# Patient Record
Sex: FEMALE | Race: WHITE | NOT HISPANIC OR LATINO | Employment: OTHER | URBAN - METROPOLITAN AREA
[De-identification: names, ages, dates, MRNs, and addresses within clinical notes are randomized per-mention and may not be internally consistent; named-entity substitution may affect disease eponyms.]

---

## 2017-07-25 ENCOUNTER — COMMUNICATION - HEALTHEAST (OUTPATIENT)
Dept: INTERNAL MEDICINE | Facility: CLINIC | Age: 82
End: 2017-07-25

## 2017-08-02 ENCOUNTER — AMBULATORY - HEALTHEAST (OUTPATIENT)
Dept: INTERNAL MEDICINE | Facility: CLINIC | Age: 82
End: 2017-08-02

## 2017-08-02 ENCOUNTER — OFFICE VISIT - HEALTHEAST (OUTPATIENT)
Dept: INTERNAL MEDICINE | Facility: CLINIC | Age: 82
End: 2017-08-02

## 2017-08-02 ENCOUNTER — COMMUNICATION - HEALTHEAST (OUTPATIENT)
Dept: INTERNAL MEDICINE | Facility: CLINIC | Age: 82
End: 2017-08-02

## 2017-08-02 ENCOUNTER — HOSPITAL ENCOUNTER (OUTPATIENT)
Dept: CT IMAGING | Facility: CLINIC | Age: 82
Discharge: HOME | End: 2017-08-02
Attending: INTERNAL MEDICINE

## 2017-08-02 DIAGNOSIS — H40.9 GLAUCOMA: ICD-10-CM

## 2017-08-02 DIAGNOSIS — R10.31 RIGHT LOWER QUADRANT PAIN: ICD-10-CM

## 2017-08-02 DIAGNOSIS — H35.30 MACULAR DEGENERATION: ICD-10-CM

## 2017-08-02 DIAGNOSIS — C18.2 MALIGNANT NEOPLASM OF ASCENDING COLON (H): ICD-10-CM

## 2017-08-02 RX ORDER — LATANOPROST 50 UG/ML
1 SOLUTION/ DROPS OPHTHALMIC AT BEDTIME
Status: SHIPPED | COMMUNITY
Start: 2017-05-15

## 2017-08-02 ASSESSMENT — MIFFLIN-ST. JEOR: SCORE: 835.45

## 2017-08-07 ENCOUNTER — HOSPITAL ENCOUNTER (OUTPATIENT)
Dept: LAB | Age: 82
Setting detail: SPECIMEN
Discharge: HOME | End: 2017-08-07

## 2017-08-07 ENCOUNTER — OFFICE VISIT - HEALTHEAST (OUTPATIENT)
Dept: SURGERY | Facility: CLINIC | Age: 82
End: 2017-08-07

## 2017-08-07 DIAGNOSIS — D49.0 COLON TUMOR: ICD-10-CM

## 2017-08-07 LAB — CEA SERPL-MCNC: 11.2 NG/ML (ref 0–3)

## 2017-08-07 ASSESSMENT — MIFFLIN-ST. JEOR: SCORE: 830.92

## 2017-08-09 ENCOUNTER — AMBULATORY - HEALTHEAST (OUTPATIENT)
Dept: SURGERY | Facility: CLINIC | Age: 82
End: 2017-08-09

## 2017-08-09 DIAGNOSIS — N20.1 URETERAL STONE: ICD-10-CM

## 2017-08-17 ENCOUNTER — COMMUNICATION - HEALTHEAST (OUTPATIENT)
Dept: INTERNAL MEDICINE | Facility: CLINIC | Age: 82
End: 2017-08-17

## 2017-08-20 ASSESSMENT — MIFFLIN-ST. JEOR: SCORE: 812.78

## 2017-08-21 ENCOUNTER — COMMUNICATION - HEALTHEAST (OUTPATIENT)
Dept: INTERNAL MEDICINE | Facility: CLINIC | Age: 82
End: 2017-08-21

## 2017-08-21 ENCOUNTER — ANESTHESIA - HEALTHEAST (OUTPATIENT)
Dept: SURGERY | Facility: HOSPITAL | Age: 82
End: 2017-08-21

## 2017-08-21 ENCOUNTER — OFFICE VISIT - HEALTHEAST (OUTPATIENT)
Dept: INTERNAL MEDICINE | Facility: CLINIC | Age: 82
End: 2017-08-21

## 2017-08-21 DIAGNOSIS — H40.9 GLAUCOMA: ICD-10-CM

## 2017-08-21 DIAGNOSIS — Z01.818 PRE-OP EXAM: ICD-10-CM

## 2017-08-21 DIAGNOSIS — C18.2 MALIGNANT NEOPLASM OF ASCENDING COLON (H): ICD-10-CM

## 2017-08-21 LAB
ATRIAL RATE - MUSE: 68 BPM
DIASTOLIC BLOOD PRESSURE - MUSE: NORMAL MMHG
INTERPRETATION ECG - MUSE: NORMAL
P AXIS - MUSE: 76 DEGREES
PR INTERVAL - MUSE: 168 MS
QRS DURATION - MUSE: 70 MS
QT - MUSE: 406 MS
QTC - MUSE: 431 MS
R AXIS - MUSE: 59 DEGREES
SYSTOLIC BLOOD PRESSURE - MUSE: NORMAL MMHG
T AXIS - MUSE: 67 DEGREES
VENTRICULAR RATE- MUSE: 68 BPM

## 2017-08-21 ASSESSMENT — MIFFLIN-ST. JEOR: SCORE: 835.45

## 2017-08-22 ENCOUNTER — SURGERY - HEALTHEAST (OUTPATIENT)
Dept: SURGERY | Facility: HOSPITAL | Age: 82
End: 2017-08-22

## 2017-08-22 ASSESSMENT — MIFFLIN-ST. JEOR
SCORE: 834.09
SCORE: 858.13

## 2017-08-25 ASSESSMENT — MIFFLIN-ST. JEOR
SCORE: 805.52
SCORE: 835.45

## 2017-08-28 ENCOUNTER — OFFICE VISIT - HEALTHEAST (OUTPATIENT)
Dept: GERIATRICS | Facility: CLINIC | Age: 82
End: 2017-08-28

## 2017-08-28 DIAGNOSIS — H40.9 GLAUCOMA, UNSPECIFIED GLAUCOMA, UNSPECIFIED LATERALITY: ICD-10-CM

## 2017-08-28 DIAGNOSIS — J43.9 PULMONARY EMPHYSEMA, UNSPECIFIED EMPHYSEMA TYPE (H): ICD-10-CM

## 2017-08-28 DIAGNOSIS — C18.9 ADENOCARCINOMA OF COLON (H): ICD-10-CM

## 2017-08-28 DIAGNOSIS — Z90.49 STATUS POST PARTIAL COLECTOMY: ICD-10-CM

## 2017-08-28 DIAGNOSIS — I95.81 POSTOPERATIVE HYPOTENSION: ICD-10-CM

## 2017-08-28 DIAGNOSIS — R00.1 BRADYCARDIA: ICD-10-CM

## 2017-08-28 DIAGNOSIS — H35.30 MACULAR DEGENERATION: ICD-10-CM

## 2017-08-29 ENCOUNTER — COMMUNICATION - HEALTHEAST (OUTPATIENT)
Dept: INTERNAL MEDICINE | Facility: CLINIC | Age: 82
End: 2017-08-29

## 2017-08-29 ENCOUNTER — AMBULATORY - HEALTHEAST (OUTPATIENT)
Dept: GERIATRICS | Facility: CLINIC | Age: 82
End: 2017-08-29

## 2017-08-31 ENCOUNTER — COMMUNICATION - HEALTHEAST (OUTPATIENT)
Dept: GERIATRICS | Facility: CLINIC | Age: 82
End: 2017-08-31

## 2017-09-05 ENCOUNTER — COMMUNICATION - HEALTHEAST (OUTPATIENT)
Dept: INTERNAL MEDICINE | Facility: CLINIC | Age: 82
End: 2017-09-05

## 2017-09-08 ENCOUNTER — OFFICE VISIT - HEALTHEAST (OUTPATIENT)
Dept: SURGERY | Facility: CLINIC | Age: 82
End: 2017-09-08

## 2017-09-08 DIAGNOSIS — D64.9 ANEMIA: ICD-10-CM

## 2017-09-08 DIAGNOSIS — C18.2 MALIGNANT NEOPLASM OF ASCENDING COLON (H): ICD-10-CM

## 2017-09-14 ENCOUNTER — RECORDS - HEALTHEAST (OUTPATIENT)
Dept: ADMINISTRATIVE | Facility: OTHER | Age: 82
End: 2017-09-14

## 2017-09-28 ENCOUNTER — COMMUNICATION - HEALTHEAST (OUTPATIENT)
Dept: INTERNAL MEDICINE | Facility: CLINIC | Age: 82
End: 2017-09-28

## 2017-10-10 ENCOUNTER — OFFICE VISIT - HEALTHEAST (OUTPATIENT)
Dept: INTERNAL MEDICINE | Facility: CLINIC | Age: 82
End: 2017-10-10

## 2017-10-10 DIAGNOSIS — N20.0 NEPHROLITHIASIS: ICD-10-CM

## 2017-10-10 DIAGNOSIS — C18.9 ADENOCARCINOMA OF COLON (H): ICD-10-CM

## 2017-10-10 DIAGNOSIS — Z01.818 PREOP EXAM FOR INTERNAL MEDICINE: ICD-10-CM

## 2017-10-10 DIAGNOSIS — H35.30 MACULAR DEGENERATION: ICD-10-CM

## 2017-10-10 ASSESSMENT — MIFFLIN-ST. JEOR: SCORE: 839.99

## 2017-10-17 ENCOUNTER — RECORDS - HEALTHEAST (OUTPATIENT)
Dept: ADMINISTRATIVE | Facility: OTHER | Age: 82
End: 2017-10-17

## 2017-10-18 ENCOUNTER — HOSPITAL ENCOUNTER (OUTPATIENT)
Dept: INTERVENTIONAL RADIOLOGY/VASCULAR | Facility: CLINIC | Age: 82
Discharge: HOME | End: 2017-10-18
Attending: UROLOGY

## 2017-10-18 ENCOUNTER — SURGERY - HEALTHEAST (OUTPATIENT)
Dept: SURGERY | Facility: CLINIC | Age: 82
End: 2017-10-18

## 2017-10-18 ENCOUNTER — ANESTHESIA - HEALTHEAST (OUTPATIENT)
Dept: SURGERY | Facility: CLINIC | Age: 82
End: 2017-10-18

## 2017-10-18 DIAGNOSIS — N20.0 CALCULUS OF LEFT KIDNEY: ICD-10-CM

## 2017-10-18 ASSESSMENT — MIFFLIN-ST. JEOR: SCORE: 803.7

## 2017-10-20 ENCOUNTER — HOME CARE/HOSPICE - HEALTHEAST (OUTPATIENT)
Dept: HOME HEALTH SERVICES | Facility: HOME HEALTH | Age: 82
End: 2017-10-20

## 2017-10-20 ENCOUNTER — COMMUNICATION - HEALTHEAST (OUTPATIENT)
Dept: SCHEDULING | Facility: CLINIC | Age: 82
End: 2017-10-20

## 2017-10-20 ASSESSMENT — MIFFLIN-ST. JEOR: SCORE: 826.38

## 2017-10-23 ENCOUNTER — HOME CARE/HOSPICE - HEALTHEAST (OUTPATIENT)
Dept: HOME HEALTH SERVICES | Facility: HOME HEALTH | Age: 82
End: 2017-10-23

## 2017-10-23 ENCOUNTER — COMMUNICATION - HEALTHEAST (OUTPATIENT)
Dept: HOME HEALTH SERVICES | Facility: HOME HEALTH | Age: 82
End: 2017-10-23

## 2017-10-30 ENCOUNTER — OFFICE VISIT - HEALTHEAST (OUTPATIENT)
Dept: INTERNAL MEDICINE | Facility: CLINIC | Age: 82
End: 2017-10-30

## 2017-10-30 DIAGNOSIS — C18.9 ADENOCARCINOMA OF COLON (H): ICD-10-CM

## 2017-10-30 DIAGNOSIS — N20.1 CALCULUS OF LEFT URETER: ICD-10-CM

## 2017-10-30 ASSESSMENT — MIFFLIN-ST. JEOR: SCORE: 839.99

## 2017-11-02 ENCOUNTER — RECORDS - HEALTHEAST (OUTPATIENT)
Dept: ADMINISTRATIVE | Facility: OTHER | Age: 82
End: 2017-11-02

## 2017-11-08 ENCOUNTER — RECORDS - HEALTHEAST (OUTPATIENT)
Dept: ADMINISTRATIVE | Facility: OTHER | Age: 82
End: 2017-11-08

## 2017-12-14 ENCOUNTER — HOSPITAL ENCOUNTER (OUTPATIENT)
Dept: ULTRASOUND IMAGING | Facility: CLINIC | Age: 82
Discharge: HOME | End: 2017-12-14
Attending: UROLOGY

## 2017-12-14 ENCOUNTER — RECORDS - HEALTHEAST (OUTPATIENT)
Dept: ADMINISTRATIVE | Facility: OTHER | Age: 82
End: 2017-12-14

## 2017-12-14 DIAGNOSIS — N20.2 CALCULUS OF KIDNEY AND URETER: ICD-10-CM

## 2018-01-31 ENCOUNTER — OFFICE VISIT - HEALTHEAST (OUTPATIENT)
Dept: INTERNAL MEDICINE | Facility: CLINIC | Age: 83
End: 2018-01-31

## 2018-01-31 ENCOUNTER — COMMUNICATION - HEALTHEAST (OUTPATIENT)
Dept: INTERNAL MEDICINE | Facility: CLINIC | Age: 83
End: 2018-01-31

## 2018-01-31 ENCOUNTER — RECORDS - HEALTHEAST (OUTPATIENT)
Dept: GENERAL RADIOLOGY | Facility: CLINIC | Age: 83
End: 2018-01-31

## 2018-01-31 DIAGNOSIS — R05.9 COUGH: ICD-10-CM

## 2018-01-31 LAB
BASOPHILS # BLD AUTO: 0.1 THOU/UL (ref 0–0.2)
BASOPHILS NFR BLD AUTO: 1 % (ref 0–2)
EOSINOPHIL # BLD AUTO: 0.3 THOU/UL (ref 0–0.4)
EOSINOPHIL NFR BLD AUTO: 4 % (ref 0–6)
ERYTHROCYTE [DISTWIDTH] IN BLOOD BY AUTOMATED COUNT: 13.7 % (ref 11–14.5)
HCT VFR BLD AUTO: 39.3 % (ref 35–47)
HGB BLD-MCNC: 13.1 G/DL (ref 12–16)
LYMPHOCYTES # BLD AUTO: 3 THOU/UL (ref 0.8–4.4)
LYMPHOCYTES NFR BLD AUTO: 31 % (ref 20–40)
MCH RBC QN AUTO: 31.2 PG (ref 27–34)
MCHC RBC AUTO-ENTMCNC: 33.3 G/DL (ref 32–36)
MCV RBC AUTO: 94 FL (ref 80–100)
MONOCYTES # BLD AUTO: 1 THOU/UL (ref 0–0.9)
MONOCYTES NFR BLD AUTO: 10 % (ref 2–10)
NEUTROPHILS # BLD AUTO: 5.3 THOU/UL (ref 2–7.7)
NEUTROPHILS NFR BLD AUTO: 55 % (ref 50–70)
PLATELET # BLD AUTO: 238 THOU/UL (ref 140–440)
PMV BLD AUTO: 7.6 FL (ref 7–10)
RBC # BLD AUTO: 4.19 MILL/UL (ref 3.8–5.4)
WBC: 9.7 THOU/UL (ref 4–11)

## 2018-01-31 ASSESSMENT — MIFFLIN-ST. JEOR: SCORE: 844.53

## 2018-02-09 ENCOUNTER — COMMUNICATION - HEALTHEAST (OUTPATIENT)
Dept: INTERNAL MEDICINE | Facility: CLINIC | Age: 83
End: 2018-02-09

## 2018-03-16 ENCOUNTER — RECORDS - HEALTHEAST (OUTPATIENT)
Dept: ADMINISTRATIVE | Facility: OTHER | Age: 83
End: 2018-03-16

## 2018-06-21 ENCOUNTER — HOSPITAL ENCOUNTER (OUTPATIENT)
Dept: RADIOLOGY | Facility: CLINIC | Age: 83
Discharge: HOME | End: 2018-06-21
Attending: UROLOGY

## 2018-06-21 ENCOUNTER — RECORDS - HEALTHEAST (OUTPATIENT)
Dept: ADMINISTRATIVE | Facility: OTHER | Age: 83
End: 2018-06-21

## 2018-06-21 DIAGNOSIS — N20.2 CALCULUS OF KIDNEY AND URETER: ICD-10-CM

## 2018-09-19 ENCOUNTER — COMMUNICATION - HEALTHEAST (OUTPATIENT)
Dept: INTERNAL MEDICINE | Facility: CLINIC | Age: 83
End: 2018-09-19

## 2018-09-19 ENCOUNTER — OFFICE VISIT - HEALTHEAST (OUTPATIENT)
Dept: INTERNAL MEDICINE | Facility: CLINIC | Age: 83
End: 2018-09-19

## 2018-09-19 DIAGNOSIS — R30.0 DYSURIA: ICD-10-CM

## 2018-09-19 DIAGNOSIS — C18.9 ADENOCARCINOMA OF COLON (H): ICD-10-CM

## 2018-09-19 LAB
ALBUMIN UR-MCNC: NEGATIVE MG/DL
APPEARANCE UR: CLEAR
BASOPHILS # BLD AUTO: 0.1 THOU/UL (ref 0–0.2)
BASOPHILS NFR BLD AUTO: 1 % (ref 0–2)
BILIRUB UR QL STRIP: NEGATIVE
COLOR UR AUTO: YELLOW
EOSINOPHIL # BLD AUTO: 0.4 THOU/UL (ref 0–0.4)
EOSINOPHIL NFR BLD AUTO: 4 % (ref 0–6)
ERYTHROCYTE [DISTWIDTH] IN BLOOD BY AUTOMATED COUNT: 11.1 % (ref 11–14.5)
GLUCOSE UR STRIP-MCNC: NEGATIVE MG/DL
HCT VFR BLD AUTO: 38.2 % (ref 35–47)
HGB BLD-MCNC: 12.7 G/DL (ref 12–16)
HGB UR QL STRIP: NEGATIVE
KETONES UR STRIP-MCNC: NEGATIVE MG/DL
LEUKOCYTE ESTERASE UR QL STRIP: NEGATIVE
LYMPHOCYTES # BLD AUTO: 3.3 THOU/UL (ref 0.8–4.4)
LYMPHOCYTES NFR BLD AUTO: 39 % (ref 20–40)
MCH RBC QN AUTO: 31.4 PG (ref 27–34)
MCHC RBC AUTO-ENTMCNC: 33.3 G/DL (ref 32–36)
MCV RBC AUTO: 94 FL (ref 80–100)
MONOCYTES # BLD AUTO: 0.8 THOU/UL (ref 0–0.9)
MONOCYTES NFR BLD AUTO: 9 % (ref 2–10)
NEUTROPHILS # BLD AUTO: 4 THOU/UL (ref 2–7.7)
NEUTROPHILS NFR BLD AUTO: 47 % (ref 50–70)
NITRATE UR QL: NEGATIVE
PH UR STRIP: 7 [PH] (ref 5–8)
PLATELET # BLD AUTO: 279 THOU/UL (ref 140–440)
PMV BLD AUTO: 8.3 FL (ref 7–10)
RBC # BLD AUTO: 4.05 MILL/UL (ref 3.8–5.4)
SP GR UR STRIP: 1.01 (ref 1–1.03)
UROBILINOGEN UR STRIP-ACNC: NORMAL
WBC: 8.5 THOU/UL (ref 4–11)

## 2018-09-19 ASSESSMENT — MIFFLIN-ST. JEOR: SCORE: 844.53

## 2018-09-24 ENCOUNTER — OFFICE VISIT - HEALTHEAST (OUTPATIENT)
Dept: SURGERY | Facility: CLINIC | Age: 83
End: 2018-09-24

## 2018-09-24 DIAGNOSIS — C18.2 MALIGNANT NEOPLASM OF ASCENDING COLON (H): ICD-10-CM

## 2018-09-24 LAB — CEA SERPL-MCNC: 6.5 NG/ML (ref 0–3)

## 2018-09-24 RX ORDER — DORZOLAMIDE HYDROCHLORIDE AND TIMOLOL MALEATE 20; 5 MG/ML; MG/ML
SOLUTION/ DROPS OPHTHALMIC
Status: SHIPPED | COMMUNITY
Start: 2018-06-21

## 2018-09-24 ASSESSMENT — MIFFLIN-ST. JEOR: SCORE: 830.92

## 2018-09-28 ENCOUNTER — HOSPITAL ENCOUNTER (OUTPATIENT)
Dept: CT IMAGING | Facility: CLINIC | Age: 83
Discharge: HOME | End: 2018-09-28
Attending: SURGERY

## 2018-09-28 DIAGNOSIS — C18.2 MALIGNANT NEOPLASM OF ASCENDING COLON (H): ICD-10-CM

## 2018-09-28 LAB
CREAT BLD-MCNC: 0.7 MG/DL
CREAT BLD-MCNC: 0.7 MG/DL (ref 0.6–1.1)
POC GFR AMER AF HE - HISTORICAL: >60 ML/MIN/1.73M2
POC GFR NON AMER AF HE - HISTORICAL: >60 ML/MIN/1.73M2

## 2018-10-02 ENCOUNTER — COMMUNICATION - HEALTHEAST (OUTPATIENT)
Dept: SURGERY | Facility: CLINIC | Age: 83
End: 2018-10-02

## 2018-11-05 ENCOUNTER — RECORDS - HEALTHEAST (OUTPATIENT)
Dept: ADMINISTRATIVE | Facility: OTHER | Age: 83
End: 2018-11-05

## 2018-11-11 ENCOUNTER — RECORDS - HEALTHEAST (OUTPATIENT)
Dept: ADMINISTRATIVE | Facility: OTHER | Age: 83
End: 2018-11-11

## 2019-05-02 ENCOUNTER — RECORDS - HEALTHEAST (OUTPATIENT)
Dept: ADMINISTRATIVE | Facility: OTHER | Age: 84
End: 2019-05-02

## 2019-09-10 ENCOUNTER — COMMUNICATION - HEALTHEAST (OUTPATIENT)
Dept: INTERNAL MEDICINE | Facility: CLINIC | Age: 84
End: 2019-09-10

## 2019-09-10 ENCOUNTER — OFFICE VISIT - HEALTHEAST (OUTPATIENT)
Dept: INTERNAL MEDICINE | Facility: CLINIC | Age: 84
End: 2019-09-10

## 2019-09-10 DIAGNOSIS — C18.9 ADENOCARCINOMA OF COLON (H): ICD-10-CM

## 2019-09-10 DIAGNOSIS — J43.9 PULMONARY EMPHYSEMA, UNSPECIFIED EMPHYSEMA TYPE (H): ICD-10-CM

## 2019-09-10 DIAGNOSIS — R68.89 COLD INTOLERANCE: ICD-10-CM

## 2019-09-10 LAB
ALBUMIN SERPL-MCNC: 3.8 G/DL (ref 3.5–5)
ALP SERPL-CCNC: 99 U/L (ref 45–120)
ALT SERPL W P-5'-P-CCNC: 10 U/L (ref 0–45)
ANION GAP SERPL CALCULATED.3IONS-SCNC: 8 MMOL/L (ref 5–18)
AST SERPL W P-5'-P-CCNC: 17 U/L (ref 0–40)
BASOPHILS # BLD AUTO: 0.1 THOU/UL (ref 0–0.2)
BASOPHILS NFR BLD AUTO: 1 % (ref 0–2)
BILIRUB SERPL-MCNC: 0.3 MG/DL (ref 0–1)
BUN SERPL-MCNC: 13 MG/DL (ref 8–28)
C REACTIVE PROTEIN LHE: 0.7 MG/DL (ref 0–0.8)
CALCIUM SERPL-MCNC: 9.6 MG/DL (ref 8.5–10.5)
CHLORIDE BLD-SCNC: 103 MMOL/L (ref 98–107)
CO2 SERPL-SCNC: 25 MMOL/L (ref 22–31)
CREAT SERPL-MCNC: 0.83 MG/DL (ref 0.6–1.1)
EOSINOPHIL # BLD AUTO: 0.3 THOU/UL (ref 0–0.4)
EOSINOPHIL NFR BLD AUTO: 3 % (ref 0–6)
ERYTHROCYTE [DISTWIDTH] IN BLOOD BY AUTOMATED COUNT: 10.7 % (ref 11–14.5)
GFR SERPL CREATININE-BSD FRML MDRD: >60 ML/MIN/1.73M2
GLUCOSE BLD-MCNC: 81 MG/DL (ref 70–125)
HCT VFR BLD AUTO: 41.6 % (ref 35–47)
HGB BLD-MCNC: 13.9 G/DL (ref 12–16)
LYMPHOCYTES # BLD AUTO: 3.8 THOU/UL (ref 0.8–4.4)
LYMPHOCYTES NFR BLD AUTO: 39 % (ref 20–40)
MCH RBC QN AUTO: 32.5 PG (ref 27–34)
MCHC RBC AUTO-ENTMCNC: 33.4 G/DL (ref 32–36)
MCV RBC AUTO: 97 FL (ref 80–100)
MONOCYTES # BLD AUTO: 0.9 THOU/UL (ref 0–0.9)
MONOCYTES NFR BLD AUTO: 9 % (ref 2–10)
NEUTROPHILS # BLD AUTO: 4.6 THOU/UL (ref 2–7.7)
NEUTROPHILS NFR BLD AUTO: 47 % (ref 50–70)
PLATELET # BLD AUTO: 287 THOU/UL (ref 140–440)
PMV BLD AUTO: 7.9 FL (ref 7–10)
POTASSIUM BLD-SCNC: 4.7 MMOL/L (ref 3.5–5)
PROT SERPL-MCNC: 7.3 G/DL (ref 6–8)
RBC # BLD AUTO: 4.27 MILL/UL (ref 3.8–5.4)
SODIUM SERPL-SCNC: 136 MMOL/L (ref 136–145)
TSH SERPL DL<=0.005 MIU/L-ACNC: 2.61 UIU/ML (ref 0.3–5)
WBC: 9.7 THOU/UL (ref 4–11)

## 2020-03-09 ENCOUNTER — COMMUNICATION - HEALTHEAST (OUTPATIENT)
Dept: INTERNAL MEDICINE | Facility: CLINIC | Age: 85
End: 2020-03-09

## 2020-03-09 ENCOUNTER — OFFICE VISIT - HEALTHEAST (OUTPATIENT)
Dept: INTERNAL MEDICINE | Facility: CLINIC | Age: 85
End: 2020-03-09

## 2020-03-09 DIAGNOSIS — J43.9 PULMONARY EMPHYSEMA, UNSPECIFIED EMPHYSEMA TYPE (H): ICD-10-CM

## 2020-03-09 DIAGNOSIS — C18.9 ADENOCARCINOMA OF COLON (H): ICD-10-CM

## 2020-03-09 LAB
ALBUMIN SERPL-MCNC: 3.4 G/DL (ref 3.5–5)
ALP SERPL-CCNC: 111 U/L (ref 45–120)
ALT SERPL W P-5'-P-CCNC: 13 U/L (ref 0–45)
ANION GAP SERPL CALCULATED.3IONS-SCNC: 10 MMOL/L (ref 5–18)
AST SERPL W P-5'-P-CCNC: 17 U/L (ref 0–40)
BILIRUB SERPL-MCNC: 0.4 MG/DL (ref 0–1)
BUN SERPL-MCNC: 11 MG/DL (ref 8–28)
CALCIUM SERPL-MCNC: 9.3 MG/DL (ref 8.5–10.5)
CHLORIDE BLD-SCNC: 101 MMOL/L (ref 98–107)
CO2 SERPL-SCNC: 25 MMOL/L (ref 22–31)
CREAT SERPL-MCNC: 0.75 MG/DL (ref 0.6–1.1)
ERYTHROCYTE [DISTWIDTH] IN BLOOD BY AUTOMATED COUNT: 11.4 % (ref 11–14.5)
GFR SERPL CREATININE-BSD FRML MDRD: >60 ML/MIN/1.73M2
GLUCOSE BLD-MCNC: 89 MG/DL (ref 70–125)
HCT VFR BLD AUTO: 38 % (ref 35–47)
HGB BLD-MCNC: 13 G/DL (ref 12–16)
MCH RBC QN AUTO: 32.3 PG (ref 27–34)
MCHC RBC AUTO-ENTMCNC: 34.3 G/DL (ref 32–36)
MCV RBC AUTO: 94 FL (ref 80–100)
PLATELET # BLD AUTO: 328 THOU/UL (ref 140–440)
PMV BLD AUTO: 8.1 FL (ref 7–10)
POTASSIUM BLD-SCNC: 4.1 MMOL/L (ref 3.5–5)
PROT SERPL-MCNC: 7.2 G/DL (ref 6–8)
RBC # BLD AUTO: 4.03 MILL/UL (ref 3.8–5.4)
SODIUM SERPL-SCNC: 136 MMOL/L (ref 136–145)
WBC: 9.6 THOU/UL (ref 4–11)

## 2020-03-09 ASSESSMENT — MIFFLIN-ST. JEOR: SCORE: 805.98

## 2020-07-14 ENCOUNTER — OFFICE VISIT - HEALTHEAST (OUTPATIENT)
Dept: INTERNAL MEDICINE | Facility: CLINIC | Age: 85
End: 2020-07-14

## 2020-07-14 DIAGNOSIS — C18.9 ADENOCARCINOMA OF COLON (H): ICD-10-CM

## 2020-07-14 DIAGNOSIS — H35.3190 NONEXUDATIVE AGE-RELATED MACULAR DEGENERATION, UNSPECIFIED LATERALITY, UNSPECIFIED STAGE: ICD-10-CM

## 2020-07-14 DIAGNOSIS — Z00.00 ROUTINE GENERAL MEDICAL EXAMINATION AT A HEALTH CARE FACILITY: ICD-10-CM

## 2020-07-14 LAB
ALBUMIN SERPL-MCNC: 4.2 G/DL (ref 3.5–5)
ALBUMIN UR-MCNC: NEGATIVE MG/DL
ALP SERPL-CCNC: 104 U/L (ref 45–120)
ALT SERPL W P-5'-P-CCNC: 14 U/L (ref 0–45)
ANION GAP SERPL CALCULATED.3IONS-SCNC: 8 MMOL/L (ref 5–18)
APPEARANCE UR: CLEAR
AST SERPL W P-5'-P-CCNC: 20 U/L (ref 0–40)
BASOPHILS # BLD AUTO: 0.1 THOU/UL (ref 0–0.2)
BASOPHILS NFR BLD AUTO: 1 % (ref 0–2)
BILIRUB SERPL-MCNC: 0.4 MG/DL (ref 0–1)
BILIRUB UR QL STRIP: NEGATIVE
BUN SERPL-MCNC: 12 MG/DL (ref 8–28)
CALCIUM SERPL-MCNC: 9.1 MG/DL (ref 8.5–10.5)
CHLORIDE BLD-SCNC: 99 MMOL/L (ref 98–107)
CO2 SERPL-SCNC: 26 MMOL/L (ref 22–31)
COLOR UR AUTO: YELLOW
CREAT SERPL-MCNC: 0.77 MG/DL (ref 0.6–1.1)
EOSINOPHIL # BLD AUTO: 0.6 THOU/UL (ref 0–0.4)
EOSINOPHIL NFR BLD AUTO: 5 % (ref 0–6)
ERYTHROCYTE [DISTWIDTH] IN BLOOD BY AUTOMATED COUNT: 12.8 % (ref 11–14.5)
GFR SERPL CREATININE-BSD FRML MDRD: >60 ML/MIN/1.73M2
GLUCOSE BLD-MCNC: 75 MG/DL (ref 70–125)
GLUCOSE UR STRIP-MCNC: NEGATIVE MG/DL
HCT VFR BLD AUTO: 40.4 % (ref 35–47)
HGB BLD-MCNC: 13.3 G/DL (ref 12–16)
HGB UR QL STRIP: NEGATIVE
KETONES UR STRIP-MCNC: NEGATIVE MG/DL
LEUKOCYTE ESTERASE UR QL STRIP: NEGATIVE
LYMPHOCYTES # BLD AUTO: 3.7 THOU/UL (ref 0.8–4.4)
LYMPHOCYTES NFR BLD AUTO: 32 % (ref 20–40)
MCH RBC QN AUTO: 31.3 PG (ref 27–34)
MCHC RBC AUTO-ENTMCNC: 32.9 G/DL (ref 32–36)
MCV RBC AUTO: 95 FL (ref 80–100)
MONOCYTES # BLD AUTO: 0.7 THOU/UL (ref 0–0.9)
MONOCYTES NFR BLD AUTO: 6 % (ref 2–10)
NEUTROPHILS # BLD AUTO: 6.2 THOU/UL (ref 2–7.7)
NEUTROPHILS NFR BLD AUTO: 55 % (ref 50–70)
NITRATE UR QL: NEGATIVE
PH UR STRIP: 7 [PH] (ref 5–8)
PLATELET # BLD AUTO: 257 THOU/UL (ref 140–440)
PMV BLD AUTO: 7.8 FL (ref 7–10)
POTASSIUM BLD-SCNC: 4.4 MMOL/L (ref 3.5–5)
PROT SERPL-MCNC: 7.3 G/DL (ref 6–8)
RBC # BLD AUTO: 4.25 MILL/UL (ref 3.8–5.4)
SODIUM SERPL-SCNC: 133 MMOL/L (ref 136–145)
SP GR UR STRIP: 1.01 (ref 1–1.03)
TSH SERPL DL<=0.005 MIU/L-ACNC: 2.26 UIU/ML (ref 0.3–5)
UROBILINOGEN UR STRIP-ACNC: NORMAL
WBC: 11.3 THOU/UL (ref 4–11)

## 2020-07-14 ASSESSMENT — MIFFLIN-ST. JEOR: SCORE: 805.98

## 2020-07-15 ENCOUNTER — COMMUNICATION - HEALTHEAST (OUTPATIENT)
Dept: INTERNAL MEDICINE | Facility: CLINIC | Age: 85
End: 2020-07-15

## 2021-05-31 VITALS — WEIGHT: 102 LBS | BODY MASS INDEX: 18.77 KG/M2 | HEIGHT: 62 IN

## 2021-05-31 VITALS — WEIGHT: 104 LBS | BODY MASS INDEX: 19.14 KG/M2 | HEIGHT: 62 IN

## 2021-05-31 VITALS — WEIGHT: 103 LBS | HEIGHT: 62 IN | BODY MASS INDEX: 18.95 KG/M2

## 2021-05-31 VITALS — WEIGHT: 104 LBS | HEIGHT: 62 IN | BODY MASS INDEX: 19.14 KG/M2

## 2021-05-31 VITALS — HEIGHT: 62 IN | BODY MASS INDEX: 18.95 KG/M2 | WEIGHT: 103 LBS

## 2021-05-31 VITALS — BODY MASS INDEX: 19.32 KG/M2 | HEIGHT: 62 IN | WEIGHT: 105 LBS

## 2021-05-31 VITALS — BODY MASS INDEX: 18.95 KG/M2 | HEIGHT: 62 IN | WEIGHT: 103 LBS

## 2021-05-31 VITALS — HEIGHT: 62 IN | BODY MASS INDEX: 18.58 KG/M2 | WEIGHT: 101 LBS

## 2021-06-02 VITALS — BODY MASS INDEX: 18.77 KG/M2 | WEIGHT: 102 LBS | HEIGHT: 62 IN

## 2021-06-02 VITALS — BODY MASS INDEX: 19.32 KG/M2 | HEIGHT: 62 IN | WEIGHT: 105 LBS

## 2021-06-03 VITALS — DIASTOLIC BLOOD PRESSURE: 86 MMHG | SYSTOLIC BLOOD PRESSURE: 132 MMHG | BODY MASS INDEX: 19.2 KG/M2 | WEIGHT: 105 LBS

## 2021-06-04 VITALS
DIASTOLIC BLOOD PRESSURE: 86 MMHG | BODY MASS INDEX: 18.88 KG/M2 | HEART RATE: 85 BPM | HEIGHT: 61 IN | SYSTOLIC BLOOD PRESSURE: 144 MMHG | WEIGHT: 100 LBS

## 2021-06-04 VITALS
WEIGHT: 100 LBS | HEIGHT: 61 IN | DIASTOLIC BLOOD PRESSURE: 84 MMHG | OXYGEN SATURATION: 97 % | BODY MASS INDEX: 18.88 KG/M2 | HEART RATE: 82 BPM | SYSTOLIC BLOOD PRESSURE: 132 MMHG

## 2021-06-06 NOTE — PROGRESS NOTES
"OFFICE VISIT NOTE    Subjective:   Chief Complaint:  Follow-up (med check) and Shortness of Breath (has noticed more SOB when walking up sairs and walking long distances)    89-year-old woman in for follow-up regarding history of adenocarcinoma the colon, history of CT evidence of emphysema of the lung, macular degeneration.  Overall continues to do well.  She lives by herself in her own home.  She takes care of her.  She does a little driving.  No recent illnesses.  No falls or injuries.  Does note a little more shortness of breath especially if she is walking uphill.    Current Outpatient Medications   Medication Sig     dorzolamide-timolol (COSOPT) 22.3-6.8 mg/mL ophthalmic solution      latanoprost (XALATAN) 0.005 % ophthalmic solution Administer 1 drop to both eyes at bedtime.        PSFHx: Tobacco Status:  She  reports that she quit smoking about 23 years ago. Her smoking use included cigarettes. She has never used smokeless tobacco.    Review of Systems:  A comprehensive review of systems is negative except for the comments above    Objective:    Pulse 82   Ht 5' 1\" (1.549 m)   Wt 100 lb (45.4 kg)   SpO2 97%   BMI 18.89 kg/m    GENERAL: No acute distress.  Weight is down 3 pounds.  Blood pressure 132/84.  Pulse is 85.  Oxygen saturations 98%.  No edema.  Lungs seem clear of rales and wheezes.  Heart shows a sinus rhythm with an occasional ectopic beat.  No JVD.  Pedal pulses seem normal for age.    Assessment & Plan   Nancy Magana is a 89 y.o. female.    Clinically stable.  I do not see any sign of recurrent adenocarcinoma the colon.  A CT scan suggested emphysema in the past.  Her oxygen saturations today are 98%.  She is in no distress.  We will check a CBC.  She will need a physical exam in July.    Diagnoses and all orders for this visit:    Adenocarcinoma of colon (H)  -     HM2(CBC w/o Differential)  -     Comprehensive Metabolic Panel    Pulmonary emphysema, unspecified emphysema type " (H)            Jose Faulkner MD  Transcription using voice recognition software, may contain typographical errors.

## 2021-06-09 NOTE — PATIENT INSTRUCTIONS - HE
Advance Directive  Patient s advance directive was discussed and I am comfortable with the patient s wishes.  Patient Education   Personalized Prevention Plan  You are due for the preventive services outlined below.  Your care team is available to assist you in scheduling these services.  If you have already completed any of these items, please share that information with your care team to update in your medical record.  Health Maintenance   Topic Date Due     COPD ACTION PLAN  09/10/1929     MEDICARE ANNUAL WELLNESS VISIT  09/10/1994     DXA SCAN  09/10/1994     TD 18+ HE  05/02/2018     ZOSTER VACCINES (3 of 3) 12/18/2018     INFLUENZA VACCINE RULE BASED (1) 08/01/2020     FALL RISK ASSESSMENT  09/10/2020     ADVANCE CARE PLANNING  10/10/2022     SPIROMETRY  Completed     PNEUMOCOCCAL IMMUNIZATION 65+ LOW/MEDIUM RISK  Completed

## 2021-06-09 NOTE — PROGRESS NOTES
Assessment and Plan:   Follow-up regarding osteoarthritis, macular degeneration, history of adenocarcinoma of the cecum removed about 2 years ago.  X-ray also did show some changes in the lungs suggesting emphysema but she is not having any pulmonary issues.  Overall, she is doing well.  She lives by herself.  She still drives a little during the day.    1. Routine general medical examination at a health care facility  Good examination for this 89-year-old woman today.  She appears spry mentally she is sharp today.  - Thyroid Cascade  - Urinalysis-UC if Indicated    2. Adenocarcinoma of colon (H)  No obvious recurrence of tumor.  No palpable masses.  We will check her labs.  - HM1(CBC and Differential)  - Comprehensive Metabolic Panel  - HM1 (CBC with Diff)    3. Nonexudative age-related macular degeneration, unspecified laterality, unspecified stage  She does see a ophthalmologist for this.        The patient's current medical problems were reviewed.    I have had an Advance Directives discussion with the patient.  The following health maintenance schedule was reviewed with the patient and provided in printed form in the after visit summary:   Health Maintenance   Topic Date Due     COPD ACTION PLAN  09/10[birth year]29     MEDICARE ANNUAL WELLNESS VISIT  09/10/1994     DXA SCAN  09/10/1994     TD 18+ HE  05/02/2018     ZOSTER VACCINES (3 of 3) 12/18/2018     INFLUENZA VACCINE RULE BASED (1) 08/01/2020     FALL RISK ASSESSMENT  09/10/2020     ADVANCE CARE PLANNING  10/10/2022     SPIROMETRY  Completed     PNEUMOCOCCAL IMMUNIZATION 65+ LOW/MEDIUM RISK  Completed        Subjective:   Chief Complaint: Nancy Magana is an 89 y.o. female here for an Annual Wellness visit.   HPI: 89-year-old woman for annual wellness visit and exam.  Continues to do well.  Cancer removed 2 years ago from the colon is not recurred.  Non-smoker  Minimal social drinking.  Minimal caffeine.  Immunizations seem to be up-to-date for her.  sHe  does not take flu shots.    Review of Systems: Comprehensive system review is done.  Eyes headache, TIAs, stroke.  No chest pain or shortness of breath.  No edema.  No orthopnea.  Minor rash in the lower legs from varicose veins.  Denies hip shoulder knee pain etc.  No fractures no dislocations.  No tremor TIAs seizures etc.    Rest of system review is also negative please see above.  The rest of the review of systems are negative for all systems.    Patient Care Team:  Jose Faulkner MD as PCP - General (Internal Medicine)  Jose Faulkner MD as Assigned PCP     Patient Active Problem List   Diagnosis     Macular degeneration     Glaucoma     Mild dementia (H)     Bradycardia     Adenocarcinoma of colon (H)     Emphysema of lung: incidental finding on abdominal CT     Calculus of left ureter     Past Medical History:   Diagnosis Date     Bradycardia      Cataracts, bilateral      Emphysema of lung (H)     patient denies     Glaucoma      Hearing loss     bilateral     Macular degeneration      Malignant neoplasm of ascending colon (H)      Mild dementia (H)      Raynauds syndrome       Past Surgical History:   Procedure Laterality Date     COLECTOMY N/A 8/22/2017    Procedure: OPEN ILEOCECECTOMY WITH PRIMARY ANASTAMOSIS, REMOVAL OF RIGHT URETERAL STENT;  Surgeon: Víctor Campebll MD;  Location: [facility] [place];  Service:      EYE SURGERY Bilateral     Cataract Extraction     HYSTERECTOMY      at age 37     MIDDLE EAR SURGERY Left     states she had a new ear drum placed     WI CYSTOURETHROSCOPY,URETER CATHETER Bilateral 8/22/2017    Procedure: CYSTOSCOPY, BILATERAL RETROGRADE PYELOGRAMS BILATERAL URETERAL STENT PLACEMENT;  Surgeon: Jose Peace MD;  Location: [facility] [place];  Service: Urology     VARICOSE VEIN SURGERY        No family history on file.   Social History     Socioeconomic History     Marital status:      Spouse name: Not on file     Number of children: Not on file      "Years of education: Not on file     Highest education level: Not on file   Occupational History     Not on file   Social Needs     Financial resource strain: Not on file     Food insecurity     Worry: Not on file     Inability: Not on file     Transportation needs     Medical: Not on file     Non-medical: Not on file   Tobacco Use     Smoking status: Former Smoker     Types: Cigarettes     Last attempt to quit:      Years since quittin.5     Smokeless tobacco: Never Used   Substance and Sexual Activity     Alcohol use: Yes     Alcohol/week: 7.0 standard drinks     Types: 7 Glasses of wine per week     Drug use: No     Sexual activity: Not on file   Lifestyle     Physical activity     Days per week: Not on file     Minutes per session: Not on file     Stress: Not on file   Relationships     Social connections     Talks on phone: Not on file     Gets together: Not on file     Attends [employer] service: Not on file     Active member of club or organization: Not on file     Attends meetings of clubs or organizations: Not on file     Relationship status: Not on file     Intimate partner violence     Fear of current or ex partner: Not on file     Emotionally abused: Not on file     Physically abused: Not on file     Forced sexual activity: Not on file   Other Topics Concern     Not on file   Social History Narrative     Not on file      Current Outpatient Medications   Medication Sig Dispense Refill     dorzolamide-timolol (COSOPT) 22.3-6.8 mg/mL ophthalmic solution        latanoprost (XALATAN) 0.005 % ophthalmic solution Administer 1 drop to both eyes at bedtime.        No current facility-administered medications for this visit.       Objective:   Vital Signs:   Visit Vitals  Ht 5' 1\" (1.549 m)   Wt 100 lb (45.4 kg)   BMI 18.89 kg/m           VisionScreening:  No exam data present     PHYSICAL   Blood pressure 144/86.  Pulse 78 and regular.  EYES: Eyelids, conjunctiva, and sclera were normal. Pupils were " normal. Cornea, iris, and lens were normal bilaterally.  Macular degeneration.  HEAD, EARS, NOSE, MOUTH, AND THROAT: Head and face were normal. Hearing was normal to voice and the ears were normal to external exam. Nose appearance was normal and there was no discharge. Oropharynx was normal.  NECK: Neck appearance was normal. There were no neck masses and the thyroid was not enlarged.  No lymphadenopathy.  No bruits  RESPIRATORY: Breathing pattern was normal and the chest moved symmetrically.  Percussion/auscultatory percussion was normal.  Lung sounds were normal and there were no abnormal sounds.  CARDIOVASCULAR: Heart rate and rhythm were normal.  S1 and S2 were normal and there were no extra sounds or murmurs. Peripheral pulses in arms and legs were normal.  Jugular venous pressure was normal.  There was no peripheral edema.  GASTROINTESTINAL: The abdomen was normal in contour.  Bowel sounds were present.  Percussion detected no organ enlargement or tenderness.  Palpation detected no tenderness, mass, or enlarged organs.  I cannot palpate any masses within the abdomen.  MUSCULOSKELETAL: Skeletal configuration was normal and muscle mass was normal for age. Joint appearance was overall normal.  OA changes of the wrists hands knees.  LYMPHATIC: There were no enlarged nodes.  SKIN/HAIR/NAILS: Skin color was normal.  There were no skin lesions.  Hair and nails were normal.  NEUROLOGIC: The patient was alert and oriented to person, place, time, and circumstance. Speech was normal. Cranial nerves were normal. Motor strength was normal for age. The patient was normally coordinated.  PSYCHIATRIC:  Mood and affect were normal and the patient had normal recent and remote memory. The patient's judgment and insight were normal.    ADDITIONAL VITAL SIGNS: Pulse in the 70s  CHEST WALL/BREASTS: Normal female  RECTAL: Not checked  GENITAL/URINARY: Normal female          Assessment Results 7/14/2020   Activities of Daily Living  No help needed   Instrumental Activities of Daily Living No help needed   Get Up and Go Score Less than 12 seconds   Mini Cog Total Score 5   Some recent data might be hidden     A Mini-Cog score of 0-2 suggests the possibility of dementia, score of 3-5 suggests no dementia  Clock drawing seems normal.  Memory for recall of objects was quite good.      Identified Health Risks:     Patient's advanced directive was discussed and I am comfortable with the patient's wishes.

## 2021-06-12 NOTE — ANESTHESIA POSTPROCEDURE EVALUATION
Patient: Nancy Magana  OPEN ILEOCECECTOMY WITH PRIMARY ANASTAMOSIS, REMOVAL OF RIGHT URETERAL STENT, CYSTOSCOPY, BILATERAL RETROGRADE PYELOGRAMS BILATERAL URETERAL STENT PLACEMENT  Anesthesia type: general    Patient location: PACU  Last vitals:   Vitals:    08/22/17 1429   BP: 131/62   Pulse: 62   Resp: 15   Temp: 36.5  C (97.7  F)   SpO2: 98%     Post vital signs: stable  Level of consciousness: awake and responds to simple questions  Post-anesthesia pain: pain controlled  Post-anesthesia nausea and vomiting: no  Pulmonary: unassisted, return to baseline  Cardiovascular: stable and blood pressure at baseline  Hydration: adequate  Anesthetic events: no    QCDR Measures:  ASA# 11 - [place] Cardiac Arrest: ASA11B - Patient did NOT experience unanticipated cardiac arrest  ASA# 12 - [place] Mortality Rate: ASA12B - Patient did NOT die  ASA# 13 - PACU Re-Intubation Rate: ASA13B - Patient did NOT require a new airway mgmt  ASA# 10 - Composite Anes Safety: ASA10A - No serious adverse event  ASA# 38 - New Corneal Injury: ASA38A - No new exposure keratitis or corneal abrasion in PACU    Additional Notes:

## 2021-06-12 NOTE — PROGRESS NOTES
Preoperative Consultation   Nancy Magana   87 y.o.  female    Date of visit: 8/21/2017  Physician: Jose Faulkner MD    This is a preoperative consultation requested by Dr. Lowe in preparation for right colon cancer resection on 8/22/2017 at [facility], fax [phone].       Assessment and Plan   Nancy Magana was seen in preoperative consultation in preparation for resection of a right sided colon malignancy..  This is a low risk surgery and the patient has no increased risk for major cardiac complications.  Please note patient also has an obstructing 1.6 cm proximal left ureteral calculus.  She has been seen preoperatively by urology.  Treatment will be placement of bilateral ureteral stents.    1. Pre-op exam    2. Malignant neoplasm of ascending colon    3. Glaucoma         Patient Profile   Social History     Social History Narrative   Patient is .  Has very supportive children.     Past Medical History   Patient Active Problem List   Diagnosis     Macular degeneration     Glaucoma       Past Surgical History  She has a past surgical history that includes Hysterectomy; Varicose vein surgery; and Eye surgery (Bilateral).     History of Present Illness   This 87 y.o. old female was recently in to see me with complaint of having some intermittent right-sided abdominal pain.  On exam, she had an easily palpable mass overlying the cecum.  CT scan indeed showed a probable malignant neoplasm in the right colon.  There were associated positive enlarged lymph nodes.  The liver looks clear.  Her CEA is slightly elevated.  She was seen by surgery whose has scheduled surgery for 8/22/2017.  She was also noted to have an incidental left-sided hydronephrosis secondary to a ureteral stone.  Urology has also seen her and will be placing bilateral ureteral stents preop.    She currently is free of any chest pain or shortness of breath.  No abdominal pain or cramping.  No passage of blood in  the stool.  No fevers or chills or recent infections.  No past history of postop phlebitis.  No history of pulmonary embolism.    Recent antiplatelet use: no  Personal or family history of bleeding or clotting disorders: no  Steroid use in the past year: no  Personal or family history of difficulty with anesthesia: no  Current cardiopulmonary symptoms: no  Last Menstrual Period: years    Review of Systems: A comprehensive review of systems was negative except as noted.     Medications and Allergies   Current Outpatient Prescriptions   Medication Sig Dispense Refill     bromfenac (BROMDAY) 0.09 % ophthalmic solution        DORZOLAMIDE HCL/TIMOLOL MALEAT (DORZOLAMIDE-TIMOLOL OPHT)        latanoprost (XALATAN) 0.005 % ophthalmic solution        No current facility-administered medications for this visit.      Facility-Administered Medications Ordered in Other Visits   Medication Dose Route Frequency Provider Last Rate Last Dose     [START ON 8/22/2017] ceFAZolin 2 g in 100 mL in D5W (ANCEF)  2 g Intravenous 30 Min Pre-Op Víctor Campbell MD         [START ON 8/22/2017] metroNIDAZOLE IVPB 500 mg (FLAGYL)  500 mg Intravenous 60 Min Pre-Op Víctor Campbell MD         No Known Allergies     Family and Social History   No family history on file.     Social History   Substance Use Topics     Smoking status: Former Smoker     Types: Cigarettes     Quit date: 1997     Smokeless tobacco: Never Used     Alcohol use 4.2 oz/week     7 Glasses of wine per week   Quit smoking over 40 years ago.     Physical Exam   General Appearance:   Thin healthy-appearing woman who is in no acute distress.  Her blood pressure is 126/84.  Pulse is 82.  Oxygen saturations 95% on room air    There were no vitals taken for this visit.    EYES: Eyelids, conjunctiva, and sclera were normal. Pupils were normal. Cornea, iris, and lens were normal bilaterally.  No scleral icterus.  HEAD, EARS, NOSE, MOUTH, AND THROAT: Head and face were normal. Hearing  was normal to voice and the ears were normal to external exam. Nose appearance was normal and there was no discharge. Oropharynx was normal.  NECK: Neck appearance was normal. There were no neck masses and the thyroid was not enlarged.  No palpable masses  RESPIRATORY: Breathing pattern was normal and the chest moved symmetrically.  Percussion/auscultatory percussion was normal.  Lung sounds were normal and there were no abnormal sounds.  CARDIOVASCULAR: Heart rate and rhythm were normal.  S1 and S2 were normal and there were no extra sounds or murmurs. Peripheral pulses in arms and legs were normal.  Jugular venous pressure was normal.  There was no peripheral edema.  GASTROINTESTINAL: The abdomen was normal in contour.  Bowel sounds were present.  Percussion detected no organ enlargement or tenderness.  Palpation reveals a firm right lower quadrant mass overlying the cecal area.  No ascites.  MUSCULOSKELETAL: Skeletal configuration was normal and muscle mass was normal for age. Joint appearance was overall normal.  LYMPHATIC: There were no enlarged nodes.  SKIN/HAIR/NAILS: Skin color was normal.  There were no skin lesions.  Hair and nails were normal.  NEUROLOGIC: The patient was alert and oriented to person, place, time, and circumstance. Speech was normal. Cranial nerves were normal. Motor strength was normal for age. The patient was normally coordinated.  PSYCHIATRIC:  Mood and affect were normal and the patient had normal recent and remote memory. The patient's judgment and insight were normal.    ADDITIONAL VITAL SIGNS: Oxygen saturations 95%  CHEST WALL/BREASTS: Normal female  RECTAL: Not checked  GENITAL/URINARY: Not checked today.  Urology has evaluated her.     Additional Tests   Laboratory: On August 2, 2017, potassium was 4.2.  Creatinine normal at 0.72.  All liver function tests normal.  Hemoglobin was 10.5 is being repeated today.    Radiology: Please see CT scan report.    Electrocardiogram: Normal  for age.  No evidence of any arrhythmia or ischemia.    Total time spent with the patient today was 40 minutes of which > 50% was spent in counseling and coordination of care     Jose Faulkner MD  Internal Medicine  Contact me at [phone]

## 2021-06-12 NOTE — PROGRESS NOTES
"OFFICE VISIT NOTE    Subjective:   Chief Complaint:  Establish Care (has been having Rt groin pain X 17has been improving. wants lab work)    87-year-old woman in for the first time to see me.  She has been complaining of some pain and points to the right lower quadrant right mid abdomen.  This been present for several weeks.  She describes a sharp discomfort.  Also has sensations if she had a pass urine but the urine would not flow properly.  She says the last few days the pain is somewhat less and she is urinating normally.  There is never been any blood in the urine.  No blood in stool.  In the past she had a hysterectomy as well as bilateral oophorectomy.  This was not for any cancer.  She had colonoscopy but years ago.  Is been no recent change in bowel habits.  No fevers or chills.  SHe is a vegan.  No change in appetite.  No flank pain.  She lives independently.  Her second   of complications of Alzheimer's disease in the recent past.  Still drives a car.  Current Outpatient Prescriptions   Medication Sig     bromfenac (BROMDAY) 0.09 % ophthalmic solution      latanoprost (XALATAN) 0.005 % ophthalmic solution        PSFHx: Tobacco Status:  She  reports that she quit smoking about 20 years ago. Her smoking use included Cigarettes. She has never used smokeless tobacco.    Review of Systems:  A comprehensive review of systems is negative except for the comments above    Objective:    Pulse 76  Ht 5' 2\" (1.575 m)  Wt 103 lb (46.7 kg)  SpO2 99%  BMI 18.84 kg/m2  GENERAL: No acute distress.  A pleasant thin woman who is in no obvious distress.  There is no jaundice.  Blood pressures 126/84.    She is afebrile.  No adenopathy in the neck.  Thyroid is not enlarged.  Lungs seem clear.  Heart shows a sinus rhythm without murmur gallop or rub.  Pedal pulses are easily palpated and seem normal for the age.  No edema.  Joints look okay for age.  The abdomen is soft.  Bowel sounds are normal.  She has " an easily palpable peach sized mass in the right side of the abdomen right and inferior to the umbilicus.  Slight tenderness only.  No organomegaly noted.  No flank tenderness.    Assessment & Plan   Nancy Magana is a 87 y.o. female.    Several weeks of right lower quadrant pain.  Patient has palpable mass.  CT the abdomen and pelvis.  Check a battery of blood tests.  Pending  scan findings, she may be require referral to a general surgeon.  She tells me she has had a total hysterectomy and bilateral salpingo-oophorectomy in the past.  She thinks her appendix is still in.    Diagnoses and all orders for this visit:    Right lower quadrant pain  -     HM1(CBC and Differential)  -     Comprehensive Metabolic Panel  -     C-Reactive Protein  -     Urinalysis-UC if Indicated  -     CT Abdomen Pelvis With Oral With IV Contrast; Future; Expected date: 8/2/17  -     HM1 (CBC with Diff)    Macular degeneration    Glaucoma        The following low BMI interventions were performed this visit: weight gain advised    Jose Faulkner MD  Transcription using voice recognition software, may contain typographical errors.

## 2021-06-12 NOTE — PROGRESS NOTES
GENERAL SURGICAL CONSULTATION    I was requested by Jose Faulkner MD to consult on this pt to evaluate them for Cecal Mass.    HPI:  This is a 87 y.o. female here today with complaints of a palpable mass in the right lower quadrant of her abdomen and occasional abdominal pains.  This patient noted a few weeks ago that she was having some significant cramping pain in her abdomen.  Patient says her bowel habits have changed with her having looser stools than she is accustomed to.  She tells me she had been losing weight over the last few years as she has been taking care of her  in late stages of Alzheimer's dementia.  She has been trying to gain that weight back but has been unable to gain weight.  She saw her primary care physician who evaluated her with a CT scan.  The CT scan showed evidence for a cecal mass that measures about 8 cm in diameter.  It also appears there are associated lymph nodes that are enlarged.  I do not see evidence for lesions within the liver.  The patient had her last colonoscopy over 10 years ago.  According to the patient's son she had a polyp removed with her last colonoscopy.    The CT scan also shows a prominent stone in her left ureter with hydronephrosis proximal to the kidney stone.  The patient is not having flank pain and is not symptomatic from this kidney stone.    Allergies:Review of patient's allergies indicates no known allergies.    No past medical history on file.    Past Surgical History:   Procedure Laterality Date     CATARACT EXTRACTION, BILATERAL       HYSTERECTOMY      at age 37     VARICOSE VEIN SURGERY         CURRENT MEDS:  Current Outpatient Prescriptions   Medication Sig Dispense Refill     bromfenac (BROMDAY) 0.09 % ophthalmic solution        DORZOLAMIDE HCL/TIMOLOL MALEAT (DORZOLAMIDE-TIMOLOL OPHT)        latanoprost (XALATAN) 0.005 % ophthalmic solution        No current facility-administered medications for this visit.        No family history on  "file.  Family history is not pertinent to this patients Chief Complaint.     reports that she quit smoking about 20 years ago. Her smoking use included Cigarettes. She has never used smokeless tobacco.    Review of Systems -   10 point Review of systems is negative except for; as mentioned above in HPI and PMHx    /74  Pulse 75  Resp 18  Ht 5' 2\" (1.575 m)  Wt 102 lb (46.3 kg)  SpO2 100%  Breastfeeding? No  BMI 18.66 kg/m2  Body mass index is 18.66 kg/(m^2).    EXAM:  GENERAL: Well developed female, appears younger than stated age  HEENT: EOMI, Anicteric Sclera, Moist Mucous Membranes,  In Mouth the pt does not have redness or bleeding gums  CARDIOVASCULAR: RRR w/out murmur   CHEST/LUNG: Clear to Auscultation  ABDOMEN:  Non tender, she has a palpable mass in the right lower quadrant of her abdomen., +BS  MUSCULOSKELETAL:  No deformities with good range of motion in all extremities  NEURO: She is ambulatory with good strength in both legs.  HEME/LYMPH: No Cervical Adenopathy or tenderness.     IMAGES:  CT ABDOMEN PELVIS W ORAL W IV CONTRAST  8/2/2017 2:36 PM      INDICATION: Pain right lower quadrant.  Palpable mass right lower quadrant midabdomen  TECHNIQUE: CT abdomen and pelvis. Multiplanar reformation images (MPR). Dose reduction techniques were used.   IV CONTRAST: Iohexol (Omni) 100 mL  COMPARISON: None.     FINDINGS:  LUNG BASES: Moderate emphysema. Partially seen 5 mm middle lobe subpleural nodule (series 2, image 1). Tiny hiatus hernia.     ABDOMEN: 2.2 cm right hepatic lesion without change and delayed imaging most suggestive of cyst. Otherwise, no liver lesion. Post cholecystectomy with, bile duct dilatation measuring up to 1.3 cm. Mild pneumobilia. Coarse pancreatic calcification may   suggest chronic pancreatitis. 1 x 1.6 cm obstructing proximal left ureteral calculus with moderate left hydronephrosis and mild urothelial and ureteral wall thickening in this region. Additional nonobstructing " left renal calculi up to 0.7 cm in the lower   pole. Delayed images demonstrate delayed excretion compared to the right. No free air or adenopathy.     PELVIS: Large mass involving the cecum and proximal ascending colon measuring roughly 5 x 7 x 7.8 cm (series 2, image 81). Mass extension into the terminal distal ileum. Mass extends along the right lower quadrant (series 2, image 84). Mild distal small   bowel feces. No evidence of obstructive process as bowel remains normal in caliber. Diffuse scattered colonic stool. Sigmoid predominant diverticulosis without diverticulitis. Prominent right lower quadrant lymph nodes with example measuring 1.1 x 1.3 cm   (series 2, images 68-69).     No bladder calculi. Short segment dissection along the proximal left common iliac artery (series 2, image 66). Nonaneurysmal abdominal aorta with diffuse plaque and no dissection.     MUSCULOSKELETAL: Bony demineralization without focal lesion.     IMPRESSION:   CONCLUSION:     1.  Cecal mass with extension into the proximal ascending colon and terminal ileum compatible with malignancy. Prominent right lower quadrant lymph nodes are suspected metastases. No definitive distant metastases.     2.  Partially seen middle lobe nodule. Recommend chest or PET/CT given cecal mass.     3.  Short segment dissection along the proximal left common iliac artery. Patency remains.      4.  Obstructing 1.6 cm proximal left ureteral calculus with moderate hydronephrosis. Other nonobstructing left renal calculi. Mild urothelial thickening may be reactive.     Assessment/Plan:  A very pleasant 87-year-old woman who is in quite good condition for her age.  She has a mass associated with the cecum.  It appears she has some adenopathy in and around this mass.  I told the patient this is a colon cancer until proven otherwise.  We discussed the various options of treatment but I recommended that given that she is in quite good physical health I believe the  best treatment to take care of this intra-abdominal mass is a surgical excision.  Once we have pathology and staging information then we can discuss oncologic treatments.  The CT scan is very helpful but I would also like to see a CEA to see if we have a tumor marker that might be helpful in following this lesion in the future.  Given the size of this mass I think an open surgical approach makes more sense than the laparoscopic surgery.  I discussed this with the patient and her son who is a family practice physician and they agree that an open surgery makes sense in her case.  Plan to move forward with surgical excision of this right lower quadrant mass as soon as possible.    The patient also appears to have an obstructing ureteral stone on the left side with proximal hydronephrosis.  The patient is asymptomatic from this lesion but I would like this to be evaluated by a urologist before we move forward with abdominal surgery.    Thank you Jose Faulkner MD for the consultation on this very pleasant woman.    Víctor Campbell MD  [facility]  [phone]

## 2021-06-12 NOTE — PROGRESS NOTES
[facility]      Facility:    [facility] [[id number]]    Code Status: FULL CODE   Hospital 8/22 through 8/26/17      Chief Complaint/Reason for Visit:  Chief Complaint   Patient presents with     H & P       HPI:   Nancy is a 87 y.o. female who had a recent history of intermittent right-sided abdominal exam.  She is quite thin, and easily palpable mass was defined over the cecal area.  She was sent for CT which showed a large mass involving the cecum and proximal ascending colon (5X7 X7.8 centimeters), extending into the terminal distal ileum, bowel of normal caliber.  Also found 1 cm x 1.6 cm obstructing proximal left ureteral calculus with moderate left hydronephrosis.  An additional nonobstructing left renal calculus up to 0.7 cm in the lower pole.  Left kidney had delayed excretion    .  On 8/22/17 Dr. Peace performed bilateral retrograde pyelogram grams with bilateral ureteral stent placement, followed by.Dr Zepeda performing an open ileocecectomy with primary anastomosis, removal of a right ureteral stent.  Pathology report showed invasive, moderately differentiated adenocarcinoma, with invasion through the muscularis propria into subserosal mesenteric fat, associated deep mesenteric abscess formation with granulation tissue, dense fibrosis and granulomatous inflammation.  She had adherent omentum .  There was no evidence of dysplasia or malignancy at the proximal and distal surgical margins.  Mesenteric lymph nodes no metastatic carcinoma.    She had transient bradycardia and hypotension postoperatively, but overall all did well postoperatively, and was discharged to [facility] on postoperative day #6    Note CT scan working up the mass also demonstrated moderate emphysema of the lungs  Other medical history includes macular degeneration, glaucoma, mild dementia.  Was given the option discharged to home, but thought she be an inconvenience to her children.  However  on meeting her today, she feels she is ready for discharge.  Message is left with her son who is a physician, to be sure he is okay with this approach.    Past Medical History:  Past Medical History:   Diagnosis Date     Cataracts, bilateral      Glaucoma      Hearing loss     bilateral           Surgical History:  Past Surgical History:   Procedure Laterality Date     COLECTOMY N/A 8/22/2017    Procedure: OPEN ILEOCECECTOMY WITH PRIMARY ANASTAMOSIS, REMOVAL OF RIGHT URETERAL STENT;  Surgeon: Víctor Campbell MD;  Location: [facility] - [place], [place];  Service:      EYE SURGERY Bilateral     Cataract Extraction     HYSTERECTOMY      at age 37     MIDDLE EAR SURGERY Left     states she had a new ear drum placed     IN CYSTOURETHROSCOPY,URETER CATHETER Bilateral 8/22/2017    Procedure: CYSTOSCOPY, BILATERAL RETROGRADE PYELOGRAMS BILATERAL URETERAL STENT PLACEMENT;  Surgeon: Jose Peace MD;  Location: [facility] [place], [place];  Service: Urology     VARICOSE VEIN SURGERY         Family History:   No family history on file.      Social History:    Social History     Social History     Marital status:      Spouse name: N/A     Number of children: N/A     Years of education: N/A     Social History Main Topics     Smoking status: Former Smoker     Types: Cigarettes     Quit date: 1997     Smokeless tobacco: Never Used     Alcohol use 4.2 oz/week     7 Glasses of wine per week     Drug use: No     Sexual activity: Not on file              Review of Systems   She is really not having any pain, she is having trouble with the diet in the facility as well as the hospital since she is a vegan, but she is tolerating oral intake,, is having no difficulty with voiding or bowel movements.  She states she is doing her own cares, is ambulatory  The remainder of the comprehensive review of systems is negative    Vitals:    08/28/17 0900   BP: 144/62   Pulse: 80   Resp: 16   Temp: 98.7  F (37.1  C)   SpO2: 99%       Physical Exam    Constitutional: She is oriented to person, place, and time. No distress.   Thin, pleasant conversationalist   HENT:   Nose: Nose normal.   Mouth/Throat: Oropharynx is clear and moist.   Eyes: Conjunctivae and EOM are normal. No scleral icterus.   Cardiovascular: Regular rhythm.    Murmur heard.  Pulmonary/Chest: Breath sounds normal. She has no wheezes. She has no rales.   Abdominal: Soft. She exhibits no distension. There is no tenderness.   Abdominal incision closed with staples, no erythema, no discharge.  Bowel sounds are decreased, but of normal pitch   Musculoskeletal: Normal range of motion. She exhibits no edema.   Lymphadenopathy:     She has no cervical adenopathy.   Neurological: She is alert and oriented to person, place, and time. She has normal reflexes.   Skin: Skin is dry. No erythema.   Psychiatric: She has a normal mood and affect. Her behavior is normal. Judgment and thought content normal.       Medication List:  Current Outpatient Prescriptions   Medication Sig     bromfenac (BROMDAY) 0.09 % ophthalmic solution Administer 1 drop to the right eye at bedtime.      cyanocobalamin 1000 MCG tablet Take 500 mcg by mouth daily.     DORZOLAMIDE HCL/TIMOLOL MALEAT (DORZOLAMIDE-TIMOLOL OPHT) Administer 1 drop to both eyes 2 (two) times a day.      HYDROcodone-acetaminophen 5-325 mg per tablet Take 1-2 tablets by mouth every 4 (four) hours as needed.     latanoprost (XALATAN) 0.005 % ophthalmic solution Administer 1 drop to both eyes daily.      magnesium oxide (MAG-OX) 400 mg tablet Take 1 tablet (400 mg total) by mouth 2 (two) times a day.     RED YEAST RICE ORAL Take 1 tablet by mouth daily.     senna-docusate (PERICOLACE) 8.6-50 mg tablet Take 1 tablet by mouth 2 (two) times a day.     VIT C/E/ZN/COPPR/LUTEIN/ZEAXAN (PRESERVISION AREDS 2 ORAL) Take 1 tablet by mouth daily.       Labs:  8/26/17         WBC 4.0 - 11.0 thou/uL 8.4   RBC 3.80 - 5.40 mill/uL 2.62 (L)   Hemoglobin 12.0 - 16.0 g/dL 7.7 (L)    RDW 11.0 - 14.5 % 14.8 (H)   Platelets 140 - 440 thou/uL 247     8/28/17         Sodium 136 - 145 mmol/L 138   Potassium 3.5 - 5.0 mmol/L 4.1   Chloride 98 - 107 mmol/L 104   CO2 22 - 31 mmol/L 27   Anion Gap, Calculation 5 - 18 mmol/L 7   Glucose 70 - 125 mg/dL 88   Calcium 8.5 - 10.5 mg/dL 9.3   BUN 8 - 28 mg/dL 10   Creatinine 0.60 - 1.10 mg/dL 0.74   GFR MDRD Non Af Amer >60 mL/min/1.73m2 >60         Assessment:    ICD-10-CM    1. Adenocarcinoma of colon C18.9    2. Status post partial colectomy: ileocecectomy Z90.49    3. Pulmonary emphysema, unspecified emphysema type J43.9    4. Postoperative hypotension I95.89    5. Macular degeneration H35.30    6. Glaucoma, unspecified glaucoma, unspecified laterality H40.9    7. Bradycardia R00.1          Plan:  Mrs. Magana is asking for discharge, message left with her son regarding the same.  She does live with an adult daughter who is available to assist her.  She is independent with her cares by her own report      Electronically signed by: Luanne Calle MD

## 2021-06-12 NOTE — ANESTHESIA CARE TRANSFER NOTE
Last vitals:   Vitals:    08/22/17 1017   BP: 123/67   Pulse: (!) 54   Resp: 19   Temp: 36.7  C (98.1  F)   SpO2: 100%     Patient's level of consciousness is drowsy  Spontaneous respirations: yes  Maintains airway independently: yes  Dentition unchanged: yes  Oropharynx: oropharynx clear of all foreign objects    QCDR Measures:  ASA# 20 - Surgical Safety Checklist: WHO surgical safety checklist completed prior to induction  PQRS# 430 - Adult PONV Prevention: 4558F - Pt received => 2 anti-emetic agents (different classes) preop & intraop  ASA# 8 - Peds PONV Prevention: NA - Not pediatric patient, not GA or 2 or more risk factors NOT present  PQRS# 424 - Merry-op Temp Management: 4559F - At least one body temp DOCUMENTED => 35.5C or 95.9F within required timeframe  PQRS# 426 - PACU Transfer Protocol: - Transfer of care checklist used  ASA# 14 - Acute Post-op Pain: ASA14B - Patient did NOT experience pain >= 7 out of 10

## 2021-06-12 NOTE — ANESTHESIA PROCEDURE NOTES
Peripheral Block    Patient location during procedure: OR  Start time: 8/22/2017 10:09 AM  End time: 8/22/2017 10:12 AM  post-op analgesia per surgeon order as noted in medical record  Staffing:  Performing  Anesthesiologist: KIRBY MONTEMAYOR  Preanesthetic Checklist  Completed: patient identified, site marked, risks, benefits, and alternatives discussed, timeout performed, consent obtained, at patient's request, airway assessed, oxygen available, suction available, emergency drugs available and hand hygiene performed  Peripheral Block  Block type: other, TAP  Prep: ChloraPrep  Patient position: supine  Patient monitoring: cardiac monitor, continuous pulse oximetry, heart rate and blood pressure  Laterality: right  Injection technique: ultrasound guided    Ultrasound used to visualize needle placement in proximity to nerve being blocked: yes   Permanent ultrasound image captured for medical record    Needle  Needle type: Stimuplex   Needle gauge: 21 G  Needle length: 6 in  no peripheral nerve catheter placed  Assessment  Injection assessment: no difficulty with injection, negative aspiration for heme, no paresthesia on injection and incremental injection

## 2021-06-12 NOTE — PROGRESS NOTES
HPI: Pt is here for follow up of a ileocecectomy.   she is doing well.  Pain is well controlled.  No difficulties with the surgical wound/wounds.  she is eating well and denies fever and chills.         /76 (Patient Site: Left Arm, Patient Position: Sitting, Cuff Size: Adult Regular)  Pulse 75  SpO2 99%    EXAM:  GENERAL:Appears well  ABDOMEN:  Soft, +BS  SURGICAL WOUNDS:  Incisions healing well, no enduration or drainage.    .lastlab[CASEREPORT    Assessment/Plan: . Doing well after surgery.  I removed staples in clinic today.  Her Hb was low at discharge 8.9,   I will put her on an iron supplement.    She should follow up as needed for any surgical needs but she should see me for a colonoscopy in one year.    Víctor Campbell MD  [facility] Department of Surgery

## 2021-06-12 NOTE — ANESTHESIA PREPROCEDURE EVALUATION
Anesthesia Evaluation        Airway   Mallampati: III  Neck ROM: limited   Pulmonary - negative ROS and normal exam                          Cardiovascular   Exercise tolerance: > or = 4 METS  Rhythm: regular  Rate: normal,         Neuro/Psych      Endo/Other       Comments: Very slight of frame, BMI 18.83    GI/Hepatic/Renal      Comments: Colon mass c/w malignancy     Other findings: Glaucoma    Lab Results       Component                Value               Date                       WBC                      7.7                 08/22/2017                 HGB                      11.0 (L)            08/22/2017                 HCT                      33.9 (L)            08/22/2017                 MCV                      90                  08/22/2017                 PLT                      334                 08/22/2017             Thin habitus      Dental - normal exam                        Anesthesia Plan  Planned anesthetic: general endotracheal  Per ERAS protocol:  precedex infusion to minimize opioid use  TAP block vs. ITN (pt consented for TAP pre-operatively)  Ketamine with induction, 40 mg      ASA 2   Induction: intravenous   Anesthetic plan and risks discussed with: patient and child/children  Anesthesia plan special considerations: video-assisted, increased risk of difficult airway, antiemetics, dexmedetomidine  Post-op plan: routine recovery

## 2021-06-12 NOTE — ANESTHESIA PROCEDURE NOTES
Peripheral Block    Patient location during procedure: OR  Start time: 8/22/2017 10:04 AM  End time: 8/22/2017 10:09 AM  post-op analgesia per surgeon order as noted in medical record  Staffing:  Performing  Anesthesiologist: KIRBY MONTEMAYOR  Preanesthetic Checklist  Completed: patient identified, site marked, risks, benefits, and alternatives discussed, timeout performed, consent obtained, at patient's request, airway assessed, oxygen available, suction available, emergency drugs available and hand hygiene performed  Peripheral Block  Block type: other, TAP  Prep: ChloraPrep  Patient position: supine  Patient monitoring: cardiac monitor, continuous pulse oximetry, heart rate and blood pressure  Laterality: left  Injection technique: ultrasound guided    Ultrasound used to visualize needle placement in proximity to nerve being blocked: yes   Permanent ultrasound image captured for medical record    Needle  Needle type: Stimuplex   Needle gauge: 21 G  Needle length: 6 in  no peripheral nerve catheter placed  Assessment  Injection assessment: no difficulty with injection, negative aspiration for heme, no paresthesia on injection and incremental injection

## 2021-06-12 NOTE — PROGRESS NOTES
[facility] Patient Outreach:     Discharge Date::  8/28/17      Reason for TCU stay (discharge diagnosis)::  Adenocarcinoma of colon, partial colectomy      Are you feeling better, the same or worse since your discharge?:  Patient is feeling better          As part of your discharge plan, did they discuss home care with you?: No            Did you receive any new medications, or was there a change to your medications?: Yes        Are you taking those medications, or do you have any established regiment?:  Started magnesium and taking.       Do you have any follow up visits scheduled with your PCP or Specialist?:  No          I'm glad to hear you're doing well and we want you to continue to do well. Your PCP would like to see you for a follow-up visit. Can we help set that up for your today?: Yes            (RN) Patient transferred to [facility]? **If immediate concers (e.g. patient is feeling worse and/or not taking new medictations), send in basket message to PCP with quick summary of concern.: Yes

## 2021-06-13 NOTE — ANESTHESIA CARE TRANSFER NOTE
Patient spontaneously breathing.  Tidal volumes > 300ml.  Following commands, suctioned and extubated with balloon down.  O2 via mask at 10L.  To PACU, VSS, SBAR report to RN per institutional handoff policy and procedure.  Transfer of care.      Last vitals:   Vitals:    10/18/17 1658   BP: 178/73   Pulse: 84   Resp: 14   Temp: 36.6  C (97.8  F)   SpO2: 100%     Patient's level of consciousness is drowsy  Spontaneous respirations: yes  Maintains airway independently: yes  Dentition unchanged: yes  Oropharynx: oropharynx clear of all foreign objects    QCDR Measures:  ASA# 20 - Surgical Safety Checklist: WHO surgical safety checklist completed prior to induction  PQRS# 430 - Adult PONV Prevention: 4558F - Pt received => 2 anti-emetic agents (different classes) preop & intraop  ASA# 8 - Peds PONV Prevention: NA - Not pediatric patient, not GA or 2 or more risk factors NOT present  PQRS# 424 - Merry-op Temp Management: 4559F - At least one body temp DOCUMENTED => 35.5C or 95.9F within required timeframe  PQRS# 426 - PACU Transfer Protocol: - Transfer of care checklist used  ASA# 14 - Acute Post-op Pain: ASA14B - Patient did NOT experience pain >= 7 out of 10

## 2021-06-13 NOTE — PROGRESS NOTES
Preoperative Consultation   Nancy Magana   88 y.o.  female in for preop exam.    Date of visit: 10/10/2017  Physician: Jose Faulkner MD    This is a preoperative consultation requested by Dr. Peace in preparation for percutaneous left nephrolithotomy on October 18, 2017 at [facility], fax [phone].       Assessment and Plan   Nancy Magana was seen in preoperative consultation in preparation for percutaneous left nephrolithotomy..  This is a low risk surgery and the patient has no increased risk for major cardiac complications.  Please note patient recently underwent colon cancer surgery and did quite well.  There is no need for continued chemotherapy as the tumor was contained and there was no adenopathy.  I believe we can proceed with anesthesia and surgery as planned.    No diagnosis found.     Patient Profile   Social History     Social History Narrative        Past Medical History   Patient Active Problem List   Diagnosis     Macular degeneration     Glaucoma     Malignant neoplasm of ascending colon     Postoperative hypotension     Mild dementia     Bradycardia     Adenocarcinoma of colon     Emphysema of lung: incidental finding on abdominal CT       Past Surgical History  She has a past surgical history that includes Hysterectomy; Varicose vein surgery; Eye surgery (Bilateral); Middle ear surgery (Left); cystourethroscopy,ureter catheter (Bilateral, 8/22/2017); and Colectomy (N/A, 8/22/2017).     History of Present Illness   This 88 y.o. old female is in for preop exam.  She has a left-sided kidney stone.    Recent antiplatelet use: no  Personal or family history of bleeding or clotting disorders: no  Steroid use in the past year: no  Personal or family history of difficulty with anesthesia: no  Current cardiopulmonary symptoms: no  Last Menstrual Period: years    Review of Systems: A comprehensive review of systems was negative except as noted.     Medications and Allergies  "  Current Outpatient Prescriptions   Medication Sig Dispense Refill     bromfenac (BROMDAY) 0.09 % ophthalmic solution Administer 1 drop to the right eye at bedtime.        cyanocobalamin 1000 MCG tablet Take 500 mcg by mouth daily.       DORZOLAMIDE HCL/TIMOLOL MALEAT (DORZOLAMIDE-TIMOLOL OPHT) Administer 1 drop to both eyes 2 (two) times a day.        ferrous gluconate (FERGON) 324 MG tablet Take 1 tablet (324 mg total) by mouth daily with breakfast.  0     latanoprost (XALATAN) 0.005 % ophthalmic solution Administer 1 drop to both eyes daily.        magnesium oxide (MAG-OX) 400 mg tablet Take 1 tablet (400 mg total) by mouth 2 (two) times a day.  0     VIT C/E/ZN/COPPR/LUTEIN/ZEAXAN (PRESERVISION AREDS 2 ORAL) Take 1 tablet by mouth daily.       No current facility-administered medications for this visit.      Allergies   Allergen Reactions     Blood-Group Specific Substance Unknown     Patient has strong rouleaux and cold auto agglutination that interferes with blood bank testing.  Please allow additional for Type and Screen results.        Family and Social History   No family history on file.     Social History   Substance Use Topics     Smoking status: Former Smoker     Types: Cigarettes     Quit date: 1997     Smokeless tobacco: Never Used     Alcohol use 4.2 oz/week     7 Glasses of wine per week        Physical Exam   General Appearance:   Thin woman who is in no distress.  Slight decrease in memory.  Blood pressure 148/80.  Pulse 70.  Oxygen saturations 98%.    Ht 5' 2\" (1.575 m)  Wt 104 lb (47.2 kg)  BMI 19.02 kg/m2    EYES: Eyelids, conjunctiva, and sclera were normal. Pupils were normal. Cornea, iris, and lens were normal bilaterally.  Macular degeneration of the right eye.  HEAD, EARS, NOSE, MOUTH, AND THROAT: Head and face were normal. Hearing was normal to voice and the ears were normal to external exam. Nose appearance was normal and there was no discharge. Oropharynx was normal.  NECK: Neck " appearance was normal. There were no neck masses and the thyroid was not enlarged.  No bruits.  RESPIRATORY: Breathing pattern was normal and the chest moved symmetrically.  Percussion/auscultatory percussion was normal.  Lung sounds were normal and there were no abnormal sounds.  CARDIOVASCULAR: Heart rate and rhythm were normal.  S1 and S2 were normal and there were no extra sounds or murmurs. Peripheral pulses in arms and legs were normal.  Jugular venous pressure was normal.  There was no peripheral edema.  GASTROINTESTINAL: The abdomen was normal in contour.  Bowel sounds were present.  Percussion detected no organ enlargement or tenderness.  Palpation detected no tenderness, mass, or enlarged organs.  Well-healed scar from recent colon cancer surgery.  No masses palpated.  No flank tenderness.  MUSCULOSKELETAL: Skeletal configuration was normal and muscle mass was normal for age. Joint appearance was overall normal.  LYMPHATIC: There were no enlarged nodes.  SKIN/HAIR/NAILS: Skin color was normal.  There were no skin lesions.  Hair and nails were normal.  NEUROLOGIC: The patient was alert and oriented to person, place, time, and circumstance. Speech was normal. Cranial nerves were normal. Motor strength was normal for age. The patient was normally coordinated.  PSYCHIATRIC:  Mood and affect were normal and the patient slight decrease in recent memory.  The patient's judgment and insight were normal.    ADDITIONAL VITAL SIGNS: Oxygen saturation 98%.  CHEST WALL/BREASTS: Normal female  RECTAL: Not checked  GENITAL/URINARY: Normal female     Additional Tests   Laboratory: Hemoglobin pending    Radiology: na    Electrocardiogram: See EKG from 8/21/2017.    Total time spent with the patient today was 40 minutes of which > 50% was spent in counseling and coordination of care     Jose Faulkner MD  Internal Medicine  Contact me at [phone]

## 2021-06-13 NOTE — ANESTHESIA POSTPROCEDURE EVALUATION
Patient: Nancy Magana  LEFT PERCUTANEOUS NEPHROLITHOTOMY WITH LASER LITHOTRIPSY  Anesthesia type: general    Patient location: PACU  Last vitals:   Vitals:    10/18/17 2200   BP: 137/62   Pulse: 85   Resp: 16   Temp:    SpO2: 99%     Post vital signs: stable  Level of consciousness: awake and responds to simple questions  Post-anesthesia pain: pain controlled  Post-anesthesia nausea and vomiting: no  Pulmonary: unassisted, return to baseline  Cardiovascular: stable and blood pressure at baseline  Hydration: adequate  Anesthetic events: no    QCDR Measures:  ASA# 11 - Merry-op Cardiac Arrest: ASA11B - Patient did NOT experience unanticipated cardiac arrest  ASA# 12 - [place]-op Mortality Rate: ASA12B - Patient did NOT die  ASA# 13 - PACU Re-Intubation Rate: ASA13B - Patient did NOT require a new airway mgmt  ASA# 10 - Composite Anes Safety: ASA10A - No serious adverse event    Additional Notes:

## 2021-06-13 NOTE — ANESTHESIA PREPROCEDURE EVALUATION
Anesthesia Evaluation      Patient summary reviewed   No history of anesthetic complications     Airway   Mallampati: II  Neck ROM: full   Pulmonary - normal exam   (+) COPD, a smoker  (-) shortness of breath, recent URI, sleep apnea                         Cardiovascular - normal exam  Exercise tolerance: < 4 METS  (-) angina  ECG reviewed  Rhythm: regular  Rate: normal,         Neuro/Psych    (+) dementia,   (-) no CVA    Endo/Other    (-) no diabetes     GI/Hepatic/Renal       Other findings: Catarac, glaucoma      Dental    (+) caps                       Anesthesia Plan  Planned anesthetic: general endotracheal    ASA 3   Induction: intravenous   Anesthetic plan and risks discussed with: patient  Anesthesia plan special considerations: antiemetics,   Post-op plan: routine recovery

## 2021-06-13 NOTE — PROGRESS NOTES
"OFFICE VISIT NOTE    Subjective:   Chief Complaint:  Hospital Visit Follow Up ([facility] on 10/18-10/20. dysuria, has stent in kidney that is causing alot of discomfort, on abx)    The[age]-year-old woman in for follow-up.  She recently had a percutaneous nephrolithotomy done.  She has a stent in place which is causing some urinary frequency.  No fevers or chills or any hematuria.  She had a colon resection several months ago is doing very well from that standpoint will not need any chemotherapy.    Current Outpatient Prescriptions   Medication Sig     bromfenac (BROMDAY) 0.09 % ophthalmic solution Administer 1 drop to the right eye daily.      cyanocobalamin 1000 MCG tablet Take 500 mcg by mouth daily.     DORZOLAMIDE HCL/TIMOLOL MALEAT (DORZOLAMIDE-TIMOLOL OPHT) Administer 1 drop to both eyes 2 (two) times a day.      ferrous gluconate (FERGON) 324 MG tablet Take 1 tablet (324 mg total) by mouth daily with breakfast.     HYDROcodone-acetaminophen (NORCO) 5-325 mg per tablet Take 1 tablet by mouth every 4 (four) hours as needed for pain.     latanoprost (XALATAN) 0.005 % ophthalmic solution Administer 1 drop to both eyes at bedtime.      levoFLOXacin (LEVAQUIN) 500 MG tablet TK 1 T PO QD     magnesium oxide (MAG-OX) 400 mg tablet Take 400 mg by mouth daily.     VIT C/E/ZN/COPPR/LUTEIN/ZEAXAN (PRESERVISION AREDS 2 ORAL) Take 1 tablet by mouth 2 (two) times a day.        Review of Systems:  A comprehensive review of systems is negative except for the comments above    Objective:    Pulse 86  Ht 5' 2\" (1.575 m)  Wt 104 lb (47.2 kg)  SpO2 98%  BMI 19.02 kg/m2  GENERAL: No acute distress.  Exam she looks to be in good health.  She is afebrile.  Blood pressure 144/70.  Pulse 80.  There is no jaundice.  Lungs are clear.  Heart does show sinus rhythm without significant gallop or any significant murmur.  The abdomen is nontender.  No flank tenderness  Calves are soft without any signs of phlebitis.    Assessment & Plan   Nancy M " [place] is a 88 y.o. female.  Medication reconciliation was done following her recent hospital discharge.  She is clinically stable.  She needs to have the stent removed this week and I think that will relieve her of her urinary frequency.  I will see her again in 3 months.  At that time we will check a CBC along with a chemistry survey and exam.    Diagnoses and all orders for this visit:    Calculus of left ureter    Adenocarcinoma of colon        Jose Faulkner MD  Transcription using voice recognition software, may contain typographical errors.

## 2021-06-15 NOTE — PROGRESS NOTES
"OFFICE VISIT NOTE    Subjective:   Chief Complaint:  Cough (runny nose, hurts to cough. no fever)    88-year-old woman 3 months ago did have a malignant is in today with a four-day history of congested cough.  She already has been taking an antibiotic, azithromycin.  She may be a little bit of a still is a heavy cough.  Some pain in the anterior chest with breathing.  No hemoptysis.  No complaints of dyspnea.  Previous x-rays have suggested emphysema.  She quit smoking 20 years ago.    Current Outpatient Prescriptions   Medication Sig     azithromycin (ZITHROMAX) 250 MG tablet      cyanocobalamin 1000 MCG tablet Take 500 mcg by mouth daily.     DORZOLAMIDE HCL/TIMOLOL MALEAT (DORZOLAMIDE-TIMOLOL OPHT) Administer 1 drop to both eyes 2 (two) times a day.      ILEVRO 0.3 % DrpS      latanoprost (XALATAN) 0.005 % ophthalmic solution Administer 1 drop to both eyes at bedtime.      VIT C/E/ZN/COPPR/LUTEIN/ZEAXAN (PRESERVISION AREDS 2 ORAL) Take 1 tablet by mouth 2 (two) times a day.        Review of Systems:  A comprehensive review of systems is negative except for the comments above    Objective:    Pulse 77  Temp 97.5  F (36.4  C) (Oral)   Ht 5' 2\" (1.575 m)  Wt 105 lb (47.6 kg)  SpO2 94%  BMI 19.2 kg/m2  GENERAL: No acute distress.  He is afebrile.  Oxygen saturations 6%.  Blood pressure 136/78.  ENT examination is unremarkable.  She does wear hearing aids.  Heart shows a regular rhythm without gallop.  Lungs do have rhonchi.  Especially on the right side.  I do not hear rales in any signs of consolidation.  No wheezing.  Respiratory rate is about 14.  No edema no cyanosis.    Assessment & Plan   Nancy Magana is a 88 y.o. female.    Chest x-ray done today.  X-ray is normal to my exam.  She is coughing less we will try Tussi-Organidin for her cough.  She will finish out her antibiotic which was started last weekend.  I am checking a CBC.    Diagnoses and all orders for this visit:    Cough  -     XR Chest 2 " Views; Future; Expected date: 1/31/18  -     HM1(CBC and Differential)  -     HM1 (CBC with Diff)        Jose Faulkner MD  Transcription using voice recognition software, may contain typographical errors.

## 2021-06-16 PROBLEM — C18.9 ADENOCARCINOMA OF COLON (H): Status: ACTIVE | Noted: 2017-08-24

## 2021-06-16 PROBLEM — N20.1 CALCULUS OF LEFT URETER: Status: ACTIVE | Noted: 2017-10-18

## 2021-06-16 PROBLEM — H35.30 MACULAR DEGENERATION: Chronic | Status: ACTIVE | Noted: 2017-08-02

## 2021-06-16 PROBLEM — J43.9 EMPHYSEMA OF LUNG (H): Status: ACTIVE | Noted: 2017-08-22

## 2021-06-16 PROBLEM — H40.9 GLAUCOMA: Status: ACTIVE | Noted: 2017-08-02

## 2021-06-19 NOTE — LETTER
Letter by Jose Faulkner MD at      Author: Jose Faulkner MD Service: -- Author Type: --    Filed:  Encounter Date: 9/10/2019 Status: (Other)         Nancy Magana  [facility] [place] [address]             September 10, 2019         Dear Ms. Magana,    Below are the results from your recent visit:    Resulted Orders   Comprehensive Metabolic Panel   Result Value Ref Range    Sodium 136 136 - 145 mmol/L    Potassium 4.7 3.5 - 5.0 mmol/L    Chloride 103 98 - 107 mmol/L    CO2 25 22 - 31 mmol/L    Anion Gap, Calculation 8 5 - 18 mmol/L    Glucose 81 70 - 125 mg/dL    BUN 13 8 - 28 mg/dL    Creatinine 0.83 0.60 - 1.10 mg/dL    GFR MDRD Af Amer >60 >60 mL/min/1.73m2    GFR MDRD Non Af Amer >60 >60 mL/min/1.73m2    Bilirubin, Total 0.3 0.0 - 1.0 mg/dL    Calcium 9.6 8.5 - 10.5 mg/dL    Protein, Total 7.3 6.0 - 8.0 g/dL    Albumin 3.8 3.5 - 5.0 g/dL    Alkaline Phosphatase 99 45 - 120 U/L    AST 17 0 - 40 U/L    ALT 10 0 - 45 U/L    Narrative    Fasting Glucose reference range is 70-99 mg/dL per  American Diabetes Association (ADA) guidelines.   C-Reactive Protein   Result Value Ref Range    CRP 0.7 0.0 - 0.8 mg/dL   Thyroid Cascade   Result Value Ref Range    TSH 2.61 0.30 - 5.00 uIU/mL   HM1 (CBC with Diff)   Result Value Ref Range    WBC 9.7 4.0 - 11.0 thou/uL    RBC 4.27 3.80 - 5.40 mill/uL    Hemoglobin 13.9 12.0 - 16.0 g/dL    Hematocrit 41.6 35.0 - 47.0 %    MCV 97 80 - 100 fL    MCH 32.5 27.0 - 34.0 pg    MCHC 33.4 32.0 - 36.0 g/dL    RDW 10.7 (L) 11.0 - 14.5 %    Platelets 287 140 - 440 thou/uL    MPV 7.9 7.0 - 10.0 fL    Neutrophils % 47 (L) 50 - 70 %    Lymphocytes % 39 20 - 40 %    Monocytes % 9 2 - 10 %    Eosinophils % 3 0 - 6 %    Basophils % 1 0 - 2 %    Neutrophils Absolute 4.6 2.0 - 7.7 thou/uL    Lymphocytes Absolute 3.8 0.8 - 4.4 thou/uL    Monocytes Absolute 0.9 0.0 - 0.9 thou/uL    Eosinophils Absolute 0.3 0.0 - 0.4 thou/uL    Basophils Absolute 0.1 0.0 - 0.2 thou/uL        Nancy, recent labs are reviewed.  Your electrolytes, blood sugar, kidney functions, and all liver function tests, were normal.  TSH, a measure of thyroid hormone levels, is normal.  CRP which measures  inflammation is normal.  Hemoglobin excellent at 13.9.  Overall, things look quite good.  I hope you had a lot of fun on your birthday!    Please call with questions or contact us using [employer].    Sincerely,        Electronically signed by Joes Faulkner MD

## 2021-06-20 NOTE — PROGRESS NOTES
GENERAL SURGICAL CONSULTATION    I was requested by Jose Faulkner MD to consult on this pt to evaluate them for follow-up of their cecal adenocarcinoma.    HPI:  This is a 89 y.o. female here today 1 year status post resection of her cecum for a large cecal mass.  The tumor was found to be adenocarcinoma and lymph nodes were negative.  With that the patient has not undergone any adjuvant therapy.  The patient tells me she is feeling well.  Bowel movements without difficulties.  No blood in her stool.  She has lost some weight.  Less than 10 pounds  which she attributes to the stress of recently losing her .  She feels her appetite is quite good.    Allergies:Blood-group specific substance    Past Medical History:   Diagnosis Date     Bradycardia      Cancer (H) 08/24/2017    colon cancer     Cataracts, bilateral      Emphysema of lung (H)     patient denies     Glaucoma      Hearing loss     bilateral     Macular degeneration      Malignant neoplasm of ascending colon (H)      Mild dementia      Postoperative hypotension      Raynauds syndrome        Past Surgical History:   Procedure Laterality Date     COLECTOMY N/A 8/22/2017    Procedure: OPEN ILEOCECECTOMY WITH PRIMARY ANASTAMOSIS, REMOVAL OF RIGHT URETERAL STENT;  Surgeon: Víctor Campbell MD;  Location: [facility];  Service:      EYE SURGERY Bilateral     Cataract Extraction     HYSTERECTOMY      at age 37     MIDDLE EAR SURGERY Left     states she had a new ear drum placed     MD CYSTOURETHROSCOPY,URETER CATHETER Bilateral 8/22/2017    Procedure: CYSTOSCOPY, BILATERAL RETROGRADE PYELOGRAMS BILATERAL URETERAL STENT PLACEMENT;  Surgeon: Jose Peace MD;  Location: [facility];  Service: Urology     VARICOSE VEIN SURGERY         CURRENT MEDS:  Current Outpatient Prescriptions   Medication Sig Dispense Refill     dorzolamide-timolol (COSOPT) 22.3-6.8 mg/mL ophthalmic solution        latanoprost (XALATAN) 0.005 % ophthalmic  "solution Administer 1 drop to both eyes at bedtime.        No current facility-administered medications for this visit.        No family history on file.  Family history is not pertinent to this patients Chief Complaint.     reports that she quit smoking about 21 years ago. Her smoking use included Cigarettes. She has never used smokeless tobacco. She reports that she drinks about 4.2 oz of alcohol per week  She reports that she does not use illicit drugs.    Review of Systems -   10 point Review of systems is negative except for; as mentioned above in HPI and PMHx    /65  Pulse 70  Resp 18  Ht 5' 2\" (1.575 m)  Wt 102 lb (46.3 kg)  SpO2 97%  BMI 18.66 kg/m2  Body mass index is 18.66 kg/(m^2).    EXAM:  GENERAL: Well developed female  HEENT: EOMI, Anicteric Sclera, Moist Mucous Membranes,  In Mouth the pt does not have redness or bleeding gums  CARDIOVASCULAR: RRR w/out murmur   CHEST/LUNG: Clear to Auscultation  ABDOMEN:  Non tender to palpation, +BS  MUSCULOSKELETAL:  No deformities with good range of motion in all extremities  NEURO: She is ambulatory with good strength in both legs.  HEME/LYMPH: No Cervical Adenopathy or tenderness.     IMAGES:  None    Assessment/Plan:  89-year-old woman who looks far less than her stated age presents a year after having a large tumor removed from her right colon.  Clinically she is doing quite well but this would be a good time to do some postoperative cancer surveillance.  I would like to evaluate this patient with a CT scan to make sure there is been no obvious spread in the local area or to the liver.  I would like to check a CEA as this was mildly elevated prior to her last surgery at the level of 13.  Finally would like to prepare her for a colonoscopy.  At age 89 I do not usually do screening colonoscopies but to follow-up on surveillance after a colon cancer I think this would be an important study.      Víctor Campbell MD  [facility]  [phone]  "

## 2021-06-20 NOTE — LETTER
Letter by Jose Faulkner MD at      Author: Jose Faulkner MD Service: -- Author Type: --    Filed:  Encounter Date: 3/9/2020 Status: (Other)         Nancy Magana  [address] [place] [address]             March 9, 2020         Dear Ms. Magana,    Below are the results from your recent visit:    Resulted Orders   HM2(CBC w/o Differential)   Result Value Ref Range    WBC 9.6 4.0 - 11.0 thou/uL    RBC 4.03 3.80 - 5.40 mill/uL    Hemoglobin 13.0 12.0 - 16.0 g/dL    Hematocrit 38.0 35.0 - 47.0 %    MCV 94 80 - 100 fL    MCH 32.3 27.0 - 34.0 pg    MCHC 34.3 32.0 - 36.0 g/dL    RDW 11.4 11.0 - 14.5 %    Platelets 328 140 - 440 thou/uL    MPV 8.1 7.0 - 10.0 fL   Comprehensive Metabolic Panel   Result Value Ref Range    Sodium 136 136 - 145 mmol/L    Potassium 4.1 3.5 - 5.0 mmol/L    Chloride 101 98 - 107 mmol/L    CO2 25 22 - 31 mmol/L    Anion Gap, Calculation 10 5 - 18 mmol/L    Glucose 89 70 - 125 mg/dL    BUN 11 8 - 28 mg/dL    Creatinine 0.75 0.60 - 1.10 mg/dL    GFR MDRD Af Amer >60 >60 mL/min/1.73m2    GFR MDRD Non Af Amer >60 >60 mL/min/1.73m2    Bilirubin, Total 0.4 0.0 - 1.0 mg/dL    Calcium 9.3 8.5 - 10.5 mg/dL    Protein, Total 7.2 6.0 - 8.0 g/dL    Albumin 3.4 (L) 3.5 - 5.0 g/dL    Alkaline Phosphatase 111 45 - 120 U/L    AST 17 0 - 40 U/L    ALT 13 0 - 45 U/L    Narrative    Fasting Glucose reference range is 70-99 mg/dL per  American Diabetes Association (ADA) guidelines.       Nancy, recent labs are reviewed.  Your hemoglobin and white blood count and platelet counts, are all normal.  Electrolytes as well as blood sugar, kidney, and liver function tests also look normal.  No major abnormalities.    Please call with questions or contact us using MyChart.    Sincerely,        Electronically signed by Jose Faulkenr MD

## 2021-06-20 NOTE — LETTER
Letter by Jose Faulkner MD at      Author: Jose Faulkner MD Service: -- Author Type: --    Filed:  Encounter Date: 7/15/2020 Status: (Other)         Nancy Magana  [facility] [place] [address]             July 15, 2020         Dear Ms. Magana,    Below are the results from your recent visit:    Resulted Orders   Comprehensive Metabolic Panel   Result Value Ref Range    Sodium 133 (L) 136 - 145 mmol/L    Potassium 4.4 3.5 - 5.0 mmol/L    Chloride 99 98 - 107 mmol/L    CO2 26 22 - 31 mmol/L    Anion Gap, Calculation 8 5 - 18 mmol/L    Glucose 75 70 - 125 mg/dL    BUN 12 8 - 28 mg/dL    Creatinine 0.77 0.60 - 1.10 mg/dL    GFR MDRD Af Amer >60 >60 mL/min/1.73m2    GFR MDRD Non Af Amer >60 >60 mL/min/1.73m2    Bilirubin, Total 0.4 0.0 - 1.0 mg/dL    Calcium 9.1 8.5 - 10.5 mg/dL    Protein, Total 7.3 6.0 - 8.0 g/dL    Albumin 4.2 3.5 - 5.0 g/dL    Alkaline Phosphatase 104 45 - 120 U/L    AST 20 0 - 40 U/L    ALT 14 0 - 45 U/L    Narrative    Fasting Glucose reference range is 70-99 mg/dL per  American Diabetes Association (ADA) guidelines.   Thyroid Cascade   Result Value Ref Range    TSH 2.26 0.30 - 5.00 uIU/mL   Urinalysis-UC if Indicated   Result Value Ref Range    Color, UA Yellow Colorless, Yellow, Straw, Light Yellow    Clarity, UA Clear Clear    Glucose, UA Negative Negative    Bilirubin, UA Negative Negative    Ketones, UA Negative Negative    Specific Gravity, UA 1.015 1.005 - 1.030    Blood, UA Negative Negative    pH, UA 7.0 5.0 - 8.0    Protein, UA Negative Negative mg/dL    Urobilinogen, UA 0.2 E.U./dL 0.2 E.U./dL, 1.0 E.U./dL    Nitrite, UA Negative Negative    Leukocytes, UA Negative Negative    Narrative    Microscopic not indicated  UC not indicated   HM1 (CBC with Diff)   Result Value Ref Range    WBC 11.3 (H) 4.0 - 11.0 thou/uL    RBC 4.25 3.80 - 5.40 mill/uL    Hemoglobin 13.3 12.0 - 16.0 g/dL    Hematocrit 40.4 35.0 - 47.0 %    MCV 95 80 - 100 fL    MCH 31.3 27.0 -  34.0 pg    MCHC 32.9 32.0 - 36.0 g/dL    RDW 12.8 11.0 - 14.5 %    Platelets 257 140 - 440 thou/uL    MPV 7.8 7.0 - 10.0 fL    Neutrophils % 55 50 - 70 %    Lymphocytes % 32 20 - 40 %    Monocytes % 6 2 - 10 %    Eosinophils % 5 0 - 6 %    Basophils % 1 0 - 2 %    Neutrophils Absolute 6.2 2.0 - 7.7 thou/uL    Lymphocytes Absolute 3.7 0.8 - 4.4 thou/uL    Monocytes Absolute 0.7 0.0 - 0.9 thou/uL    Eosinophils Absolute 0.6 (H) 0.0 - 0.4 thou/uL    Basophils Absolute 0.1 0.0 - 0.2 thou/uL       Nancy, recent labs are reviewed.  Your electrolytes were normal except for a slightly depressed sodium at 133.  Blood sugar, kidney functions, and liver function tests, were all normal.  TSH, which measures thyroid hormone levels, is in a normal range.  The urinalysis was normal without any signs of infection.  I checked your hemoglobin and it was stable at 13.3.    Please call with questions or contact us using [employer].    Sincerely,        Electronically signed by Jose Faulkner MD

## 2021-06-20 NOTE — PROGRESS NOTES
OFFICE VISIT NOTE    Subjective:   Chief Complaint:  No chief complaint on file.    89-year-old follow-up regarding past history of adenocarcinoma colon resected about 1 year ago.  She also has a history of calculus of the left ureter that was removed several months after the colon surgery.  She seems to be doing okay.  She does have some macular degeneration of the eyes.  She does take high-dose vitamins.  She denies any abdominal pain.  No shortness of breath.  No cardiopulmonary symptoms.  She does complain of some mild dysuria and frequency for about 1 month.  No visible hematuria.  No flank pain.  She tends to believe in a lot of natural vitamins, minerals etc.  Does not believe in drinking milk or dairy products.  Not believe in vaccinations.    Current Outpatient Prescriptions   Medication Sig     azithromycin (ZITHROMAX) 250 MG tablet      codeine-guaiFENesin (GUAIFENESIN AC)  mg/5 mL liquid Take 5 mL by mouth 2 (two) times a day as needed for cough.     cyanocobalamin 1000 MCG tablet Take 500 mcg by mouth daily.     DORZOLAMIDE HCL/TIMOLOL MALEAT (DORZOLAMIDE-TIMOLOL OPHT) Administer 1 drop to both eyes 2 (two) times a day.      ILEVRO 0.3 % DrpS      latanoprost (XALATAN) 0.005 % ophthalmic solution Administer 1 drop to both eyes at bedtime.      VIT C/E/ZN/COPPR/LUTEIN/ZEAXAN (PRESERVISION AREDS 2 ORAL) Take 1 tablet by mouth 2 (two) times a day.        Review of Systems:  A comprehensive review of systems is negative except for the comments above    Objective:    There were no vitals taken for this visit.  GENERAL: No acute distress.  Weight is stable.  She appears to be in good health.  She is in no distress.  Blood pressures 136/76.  Pulse is 70 and regular.  Oxygen saturations 96% on room air.  There is no jaundice.  Lungs have a few squeaks but no rales or signs of consolidation.  Heart shows a sinus rhythm without ectopic beats.  The abdomen is without masses or any organomegaly.  No  ascites.  No peripheral edema.  Eyes look okay.  No suprapubic tenderness.  No flank tenderness.    Assessment & Plan   Nancy Magana is a 89 y.o. female.    History of colon cancer resected about a year ago with no recurrence.  Patient doing well.  He she did not require chemotherapy.  Dysuria-frequency check a UA and if necessary a urine culture.  Will check a hemoglobin today.  She does not want a flu shot.  Return to clinic in 6 months or sooner as needed.    Diagnoses and all orders for this visit:    Adenocarcinoma of colon (H)  -     HM1(CBC and Differential)  -     HM1 (CBC with Diff)    Dysuria  -     Urinalysis-UC if Indicated        Jose Faulkner MD  Transcription using voice recognition software, may contain typographical errors.

## 2021-08-05 ENCOUNTER — TELEPHONE (OUTPATIENT)
Dept: SURGERY | Facility: CLINIC | Age: 86
End: 2021-08-05

## 2021-08-05 NOTE — TELEPHONE ENCOUNTER
I returned the call to Nancy and we discussed her prior surgery in 2017 and recommendations. She is doing well and has no changes or concerns. She will follow up with her primary care physician.

## 2021-08-05 NOTE — TELEPHONE ENCOUNTER
Patient states she saw Dr Campbell back in 2017 for surgery. She had cancer. She is concerned because she hasnt done any kind of follow up with us and is wondering if she should be coming back to see us. Can someone assist?    thankyou

## 2022-02-09 ENCOUNTER — TELEPHONE (OUTPATIENT)
Dept: SURGERY | Facility: CLINIC | Age: 87
End: 2022-02-09
Payer: MEDICARE

## 2022-02-09 NOTE — TELEPHONE ENCOUNTER
Spoke to Nancy. She states she has new diarrhea and gas that has been going on for about 2 weeks a couple times per day accompanied by weight loss. She has a history of colon cancer and had a ileocecectomy in 2017 by Dr. Campbell. She says she has not had a colonoscopy since her surgery. Patient scheduled to see Dr. Campbell.       [facility]      Krystal Sanchez RN  [facility]  General Surgery  [address] [place] [id number]  [email or web address]   Office:[phone]  Employed by [facility],

## 2022-02-09 NOTE — TELEPHONE ENCOUNTER
Nancy is calling and asking to speak to a nurse for Dr Campbell. She is having unusual diarrhea and gas. She is also loosing weight that's unexplained. She is concerned that her colon cancer has come back. She would like a call back at [phone]. She's not sure what her next steps should or if she should make an appt to see him in clinic. He doesn't have availability until 2/23.

## 2022-02-11 ENCOUNTER — OFFICE VISIT (OUTPATIENT)
Dept: SURGERY | Facility: CLINIC | Age: 87
End: 2022-02-11
Payer: MEDICARE

## 2022-02-11 ENCOUNTER — LAB (OUTPATIENT)
Dept: LAB | Facility: CLINIC | Age: 87
End: 2022-02-11

## 2022-02-11 VITALS
WEIGHT: 101 LBS | HEIGHT: 61 IN | DIASTOLIC BLOOD PRESSURE: 68 MMHG | BODY MASS INDEX: 19.07 KG/M2 | SYSTOLIC BLOOD PRESSURE: 110 MMHG

## 2022-02-11 DIAGNOSIS — C18.2 MALIGNANT NEOPLASM OF ASCENDING COLON (H): ICD-10-CM

## 2022-02-11 DIAGNOSIS — C18.2 MALIGNANT NEOPLASM OF ASCENDING COLON (H): Primary | ICD-10-CM

## 2022-02-11 LAB
ALBUMIN SERPL-MCNC: 3.9 G/DL (ref 3.5–5)
ALP SERPL-CCNC: 94 U/L (ref 45–120)
ALT SERPL W P-5'-P-CCNC: 13 U/L (ref 0–45)
ANION GAP SERPL CALCULATED.3IONS-SCNC: 11 MMOL/L (ref 5–18)
AST SERPL W P-5'-P-CCNC: 19 U/L (ref 0–40)
BILIRUB SERPL-MCNC: 0.5 MG/DL (ref 0–1)
BUN SERPL-MCNC: 12 MG/DL (ref 8–28)
CALCIUM SERPL-MCNC: 9.2 MG/DL (ref 8.5–10.5)
CEA SERPL-MCNC: 4.2 NG/ML (ref 0–3)
CHLORIDE BLD-SCNC: 105 MMOL/L (ref 98–107)
CO2 SERPL-SCNC: 23 MMOL/L (ref 22–31)
CREAT SERPL-MCNC: 0.74 MG/DL (ref 0.6–1.1)
ERYTHROCYTE [DISTWIDTH] IN BLOOD BY AUTOMATED COUNT: 12.8 % (ref 10–15)
GFR SERPL CREATININE-BSD FRML MDRD: 75 ML/MIN/1.73M2
GLUCOSE BLD-MCNC: 89 MG/DL (ref 70–125)
HCT VFR BLD AUTO: 39.8 % (ref 35–47)
HGB BLD-MCNC: 12.9 G/DL (ref 11.7–15.7)
MCH RBC QN AUTO: 32.3 PG (ref 26.5–33)
MCHC RBC AUTO-ENTMCNC: 32.4 G/DL (ref 31.5–36.5)
MCV RBC AUTO: 100 FL (ref 78–100)
PLATELET # BLD AUTO: 249 10E3/UL (ref 150–450)
POTASSIUM BLD-SCNC: 4.4 MMOL/L (ref 3.5–5)
PROT SERPL-MCNC: 6.9 G/DL (ref 6–8)
RBC # BLD AUTO: 4 10E6/UL (ref 3.8–5.2)
SODIUM SERPL-SCNC: 139 MMOL/L (ref 136–145)
WBC # BLD AUTO: 12.3 10E3/UL (ref 4–11)

## 2022-02-11 PROCEDURE — 82378 CARCINOEMBRYONIC ANTIGEN: CPT

## 2022-02-11 PROCEDURE — 36415 COLL VENOUS BLD VENIPUNCTURE: CPT

## 2022-02-11 PROCEDURE — 85027 COMPLETE CBC AUTOMATED: CPT

## 2022-02-11 PROCEDURE — 99204 OFFICE O/P NEW MOD 45 MIN: CPT | Performed by: SURGERY

## 2022-02-11 PROCEDURE — 80053 COMPREHEN METABOLIC PANEL: CPT

## 2022-02-11 RX ORDER — ONDANSETRON 2 MG/ML
4 INJECTION INTRAMUSCULAR; INTRAVENOUS
Status: CANCELLED | OUTPATIENT
Start: 2022-02-11

## 2022-02-11 RX ORDER — LIDOCAINE 40 MG/G
CREAM TOPICAL
Status: CANCELLED | OUTPATIENT
Start: 2022-02-11

## 2022-02-11 ASSESSMENT — MIFFLIN-ST. JEOR: SCORE: 805.51

## 2022-02-11 NOTE — LETTER
2/11/2022         RE: Nancy Magana  [address]  [place] [address]        Dear Colleague,    Thank you for referring your patient, Nancy Magana, to the [facility] [place]. Please see a copy of my visit note below.    GENERAL SURGICAL CONSULTATION    I was requested by Jose Faulkner to consult on this pt to evaluate them for colon cancer follow-up    HPI:  This is a 89 year old female here today with complaints of change in bowel habits.  She is having diarrhea that is causing her to have 3-4 loose stools a day.  This patient is status post a moderately differentiated adenocarcinoma that was 9 cm in diameter.  This was a T3 N0 M0 colon cancer.  Her CEA at the time of the colon resection 2017 was 11 her CEA from 6 months ago was 5.6.  She feels that she is loosing weight currently at 95 labs where she feels her normal would be 105.      Allergies:Blood-group specific substance    Past Medical History:   Diagnosis Date     Bradycardia      Cataracts, bilateral      Emphysema of lung (H)     patient denies     Glaucoma      Hearing loss     bilateral     Macular degeneration      Malignant neoplasm of ascending colon (H)      Raynauds syndrome        Past Surgical History:   Procedure Laterality Date     COLECTOMY N/A 8/22/2017    Procedure: OPEN ILEOCECECTOMY WITH PRIMARY ANASTAMOSIS, REMOVAL OF RIGHT URETERAL STENT;  Surgeon: Víctor Campbell MD;  Location: [facility] [place];  Service:      EYE SURGERY Bilateral     Cataract Extraction     HYSTERECTOMY      at age 37     IR NEPHROLITHOTOMY  10/18/2017     MIDDLE EAR SURGERY Left     states she had a new ear drum placed     HI CYSTOURETHROSCOPY,URETER CATHETER Bilateral 8/22/2017    Procedure: CYSTOSCOPY, BILATERAL RETROGRADE PYELOGRAMS BILATERAL URETERAL STENT PLACEMENT;  Surgeon: Jose Peace MD;  Location: [facility] [place];  Service: Urology     STRIP VEIN         CURRENT  "MEDS:  Current Outpatient Medications   Medication Sig Dispense Refill     dorzolamide-timolol (COSOPT) 22.3-6.8 mg/mL ophthalmic solution [DORZOLAMIDE-TIMOLOL (COSOPT) 22.3-6.8 MG/ML OPHTHALMIC SOLUTION]        latanoprost (XALATAN) 0.005 % ophthalmic solution [LATANOPROST (XALATAN) 0.005 % OPHTHALMIC SOLUTION] Administer 1 drop to both eyes at bedtime.        vitamin B-12 (CYANOCOBALAMIN) 1000 MCG tablet Take 1 tablet by mouth daily         History reviewed. No pertinent family history.  Family history is not pertinent to this patients Chief Complaint.     reports that she quit smoking about 25 years ago. Her smoking use included cigarettes. She has never used smokeless tobacco. She reports current alcohol use of about 7.0 standard drinks of alcohol per week. She reports that she does not use drugs.    Review of Systems -   10 point Review of systems is negative except for; as mentioned above in HPI and PMHx    /68   Ht 1.549 m (5' 1\")   Wt 45.8 kg (101 lb)   BMI 19.08 kg/m    Body mass index is 19.08 kg/m .    EXAM:  GENERAL: Well developed female, petite womany  HEENT: EOMI, Anicteric Sclera, Moist Mucous Membranes,  In Mouth the pt does not have redness or bleeding gums  CARDIOVASCULAR: RRR w/out murmur   CHEST/LUNG: Clear to Auscultation  ABDOMEN:  Non tender to palpation, +BS  MUSCULOSKELETAL:  No deformities with good range of motion in all extremities  NEURO: She is ambulatory with good strength in both legs.  HEME/LYMPH: No obvious Adenopathy or tenderness.     IMAGES:  No recent studies.    Assessment/Plan:  89 yr old woman with a history of Colon CA, T3, N0, M0.  She did not do any chemotherapy.  She had a CEA of 5.6 six months ago. Her current complaint is that it is hard to keep her weight up and she is having diarrhea.  I have concerns about recurrence of her colon cancer.  This needs to be worked up and she tells me that she no longer has a primary care physician.  So I would like to;    1. " Check current CEA  2. PET CT of Ab and Pelvis  3. Colonoscopy       Víctor Campbell MD  [facility]  [phone]      Again, thank you for allowing me to participate in the care of your patient.        Sincerely,        Víctor Campbell MD

## 2022-02-11 NOTE — PROGRESS NOTES
GENERAL SURGICAL CONSULTATION    I was requested by Jose Faulkner to consult on this pt to evaluate them for colon cancer follow-up    HPI:  This is a 89 year old female here today with complaints of change in bowel habits.  She is having diarrhea that is causing her to have 3-4 loose stools a day.  This patient is status post a moderately differentiated adenocarcinoma that was 9 cm in diameter.  This was a T3 N0 M0 colon cancer.  Her CEA at the time of the colon resection 2017 was 11 her CEA from 6 months ago was 5.6.  She feels that she is loosing weight currently at 95 labs where she feels her normal would be 105.      Allergies:Blood-group specific substance    Past Medical History:   Diagnosis Date     Bradycardia      Cataracts, bilateral      Emphysema of lung (H)     patient denies     Glaucoma      Hearing loss     bilateral     Macular degeneration      Malignant neoplasm of ascending colon (H)      Raynauds syndrome        Past Surgical History:   Procedure Laterality Date     COLECTOMY N/A 8/22/2017    Procedure: OPEN ILEOCECECTOMY WITH PRIMARY ANASTAMOSIS, REMOVAL OF RIGHT URETERAL STENT;  Surgeon: Víctor Campbell MD;  [place] [facility] [place]:      EYE SURGERY Bilateral     Cataract Extraction     HYSTERECTOMY      at age 37     IR NEPHROLITHOTOMY  10/18/2017     MIDDLE EAR SURGERY Left     states she had a new ear drum placed     IN CYSTOURETHROSCOPY,URETER CATHETER Bilateral 8/22/2017    Procedure: CYSTOSCOPY, BILATERAL RETROGRADE PYELOGRAMS BILATERAL URETERAL STENT PLACEMENT;  Surgeon: Jose Peace MD;  [place] [facility] [place]  Service: Urology     STRIP VEIN         CURRENT MEDS:  Current Outpatient Medications   Medication Sig Dispense Refill     dorzolamide-timolol (COSOPT) 22.3-6.8 mg/mL ophthalmic solution [DORZOLAMIDE-TIMOLOL (COSOPT) 22.3-6.8 MG/ML OPHTHALMIC SOLUTION]        latanoprost (XALATAN) 0.005 % ophthalmic solution [LATANOPROST (XALATAN) 0.005 %  "OPHTHALMIC SOLUTION] Administer 1 drop to both eyes at bedtime.        vitamin B-12 (CYANOCOBALAMIN) 1000 MCG tablet Take 1 tablet by mouth daily         History reviewed. No pertinent family history.  Family history is not pertinent to this patients Chief Complaint.     reports that she quit smoking about 25 years ago. Her smoking use included cigarettes. She has never used smokeless tobacco. She reports current alcohol use of about 7.0 standard drinks of alcohol per week. She reports that she does not use drugs.    Review of Systems -   10 point Review of systems is negative except for; as mentioned above in HPI and PMHx    /68   Ht 1.549 m (5' 1\")   Wt 45.8 kg (101 lb)   BMI 19.08 kg/m    Body mass index is 19.08 kg/m .    EXAM:  GENERAL: Well developed female, petite womany  HEENT: EOMI, Anicteric Sclera, Moist Mucous Membranes,  In Mouth the pt does not have redness or bleeding gums  CARDIOVASCULAR: RRR w/out murmur   CHEST/LUNG: Clear to Auscultation  ABDOMEN:  Non tender to palpation, +BS  MUSCULOSKELETAL:  No deformities with good range of motion in all extremities  NEURO: She is ambulatory with good strength in both legs.  HEME/LYMPH: No obvious Adenopathy or tenderness.     IMAGES:  No recent studies.    Assessment/Plan:  89 yr old woman with a history of Colon CA, T3, N0, M0.  She did not do any chemotherapy.  She had a CEA of 5.6 six months ago. Her current complaint is that it is hard to keep her weight up and she is having diarrhea.  I have concerns about recurrence of her colon cancer.  This needs to be worked up and she tells me that she no longer has a primary care physician.  So I would like to;    1. Check current CEA  2. PET CT of Ab and Pelvis  3. Colonoscopy       Víctor Campbell MD  [facility]  [phone]  "

## 2022-02-14 ENCOUNTER — HOSPITAL ENCOUNTER (OUTPATIENT)
Dept: PET IMAGING | Facility: HOSPITAL | Age: 87
Discharge: HOME | End: 2022-02-14
Attending: SURGERY | Admitting: SURGERY
Payer: MEDICARE

## 2022-02-14 ENCOUNTER — TELEPHONE (OUTPATIENT)
Dept: SURGERY | Facility: CLINIC | Age: 87
End: 2022-02-14

## 2022-02-14 DIAGNOSIS — C18.2 MALIGNANT NEOPLASM OF ASCENDING COLON (H): ICD-10-CM

## 2022-02-14 LAB — GLUCOSE BLDC GLUCOMTR-MCNC: 69 MG/DL (ref 70–99)

## 2022-02-14 PROCEDURE — 74177 CT ABD & PELVIS W/CONTRAST: CPT | Mod: 59,PI

## 2022-02-14 PROCEDURE — 74177 CT ABD & PELVIS W/CONTRAST: CPT | Mod: 26 | Performed by: RADIOLOGY

## 2022-02-14 PROCEDURE — 343N000001 HC RX 343: Performed by: SURGERY

## 2022-02-14 PROCEDURE — 250N000011 HC RX IP 250 OP 636: Performed by: SURGERY

## 2022-02-14 PROCEDURE — 82962 GLUCOSE BLOOD TEST: CPT

## 2022-02-14 PROCEDURE — A9552 F18 FDG: HCPCS | Performed by: SURGERY

## 2022-02-14 PROCEDURE — 71260 CT THORAX DX C+: CPT | Mod: 26 | Performed by: RADIOLOGY

## 2022-02-14 PROCEDURE — 78816 PET IMAGE W/CT FULL BODY: CPT | Mod: 26 | Performed by: RADIOLOGY

## 2022-02-14 RX ORDER — IOPAMIDOL 755 MG/ML
50 INJECTION, SOLUTION INTRAVASCULAR ONCE
Status: COMPLETED | OUTPATIENT
Start: 2022-02-14 | End: 2022-02-14

## 2022-02-14 RX ADMIN — IOPAMIDOL 50 ML: 755 INJECTION, SOLUTION INTRAVENOUS at 08:57

## 2022-02-14 RX ADMIN — FLUDEOXYGLUCOSE F-18 9.86 MCI.: 500 INJECTION, SOLUTION INTRAVENOUS at 07:52

## 2022-02-16 ENCOUNTER — HOSPITAL ENCOUNTER (OUTPATIENT)
Facility: AMBULATORY SURGERY CENTER | Age: 87
End: 2022-02-16
Attending: SURGERY
Payer: MEDICARE

## 2022-02-16 DIAGNOSIS — Z11.59 ENCOUNTER FOR SCREENING FOR OTHER VIRAL DISEASES: Primary | ICD-10-CM

## 2022-02-16 DIAGNOSIS — C18.2 MALIGNANT NEOPLASM OF ASCENDING COLON (H): ICD-10-CM

## 2022-02-16 NOTE — TELEPHONE ENCOUNTER
Spoke with Nancy way regarding surgery scheduling   with Dr. Dr. Campbell   at [facility] on  date: 3/3/22 Estimated arrival 2:00 PM    Patient was informed of the followin. Patient has been informed to consult their PCP/Cardiologist about stopping their blood thinners about a week before surgery.  NO Blood thinner or Oxygen use of any kind.    2. Pre Op Physical will need to be done within 30 days prior to surgery  3. Required Covid Test with in 4 days prior to surgery: Date: 22 at 2:30 p.m., Location: [place]  4. Ride required after surgery, can not use [employer] or public transportation.    Patient was informed that failure to do so may result in cancellation of surgery    Post Op:N/A    Surgery Letter sent via mail  With instructions for pre-surgery cleanse also!     Statement Selected

## 2022-02-25 NOTE — TELEPHONE ENCOUNTER
2/25/22-Received message from [facility] preop RN Nereida ROE that patient has spoken with Dr Campbell and decided to cancel colonoscopy.//dandre E[facility]     [facility]  Surgery Clinic - [facility]  Weight Management Clinic - [facility]  [address] [place] [address] [address]  [place] [address]

## 2024-05-30 ENCOUNTER — LAB REQUISITION (OUTPATIENT)
Dept: LAB | Facility: CLINIC | Age: 89
End: 2024-05-30
Payer: MEDICARE

## 2024-05-30 DIAGNOSIS — Z51.81 ENCOUNTER FOR THERAPEUTIC DRUG LEVEL MONITORING: ICD-10-CM

## 2024-05-31 LAB
BASOPHILS # BLD AUTO: 0.1 10E3/UL (ref 0–0.2)
BASOPHILS NFR BLD AUTO: 1 %
EOSINOPHIL # BLD AUTO: 0.3 10E3/UL (ref 0–0.7)
EOSINOPHIL NFR BLD AUTO: 4 %
ERYTHROCYTE [DISTWIDTH] IN BLOOD BY AUTOMATED COUNT: 12.8 % (ref 10–15)
HCT VFR BLD AUTO: 37.7 % (ref 35–47)
HGB BLD-MCNC: 12.2 G/DL (ref 11.7–15.7)
IMM GRANULOCYTES # BLD: 0 10E3/UL
IMM GRANULOCYTES NFR BLD: 0 %
LYMPHOCYTES # BLD AUTO: 2.6 10E3/UL (ref 0.8–5.3)
LYMPHOCYTES NFR BLD AUTO: 37 %
MCH RBC QN AUTO: 31.4 PG (ref 26.5–33)
MCHC RBC AUTO-ENTMCNC: 32.4 G/DL (ref 31.5–36.5)
MCV RBC AUTO: 97 FL (ref 78–100)
MONOCYTES # BLD AUTO: 0.9 10E3/UL (ref 0–1.3)
MONOCYTES NFR BLD AUTO: 12 %
NEUTROPHILS # BLD AUTO: 3.2 10E3/UL (ref 1.6–8.3)
NEUTROPHILS NFR BLD AUTO: 46 %
NRBC # BLD AUTO: 0 10E3/UL
NRBC BLD AUTO-RTO: 0 /100
PLATELET # BLD AUTO: 216 10E3/UL (ref 150–450)
RBC # BLD AUTO: 3.89 10E6/UL (ref 3.8–5.2)
WBC # BLD AUTO: 7.1 10E3/UL (ref 4–11)

## 2024-05-31 PROCEDURE — 80048 BASIC METABOLIC PNL TOTAL CA: CPT | Mod: ORL | Performed by: INTERNAL MEDICINE

## 2024-05-31 PROCEDURE — 36415 COLL VENOUS BLD VENIPUNCTURE: CPT | Mod: ORL | Performed by: INTERNAL MEDICINE

## 2024-05-31 PROCEDURE — 85025 COMPLETE CBC W/AUTO DIFF WBC: CPT | Mod: ORL | Performed by: INTERNAL MEDICINE

## 2024-06-01 LAB
ANION GAP SERPL CALCULATED.3IONS-SCNC: 12 MMOL/L (ref 7–15)
BUN SERPL-MCNC: 11.6 MG/DL (ref 8–23)
CALCIUM SERPL-MCNC: 8.7 MG/DL (ref 8.2–9.6)
CHLORIDE SERPL-SCNC: 101 MMOL/L (ref 98–107)
CREAT SERPL-MCNC: 0.64 MG/DL (ref 0.51–0.95)
DEPRECATED HCO3 PLAS-SCNC: 22 MMOL/L (ref 22–29)
EGFRCR SERPLBLD CKD-EPI 2021: 81 ML/MIN/1.73M2
GLUCOSE SERPL-MCNC: 85 MG/DL (ref 70–99)
POTASSIUM SERPL-SCNC: 3.9 MMOL/L (ref 3.4–5.3)
SODIUM SERPL-SCNC: 135 MMOL/L (ref 135–145)

## 2024-06-02 ENCOUNTER — LAB REQUISITION (OUTPATIENT)
Dept: LAB | Facility: CLINIC | Age: 89
End: 2024-06-02
Payer: MEDICARE

## 2024-06-02 DIAGNOSIS — I10 ESSENTIAL (PRIMARY) HYPERTENSION: ICD-10-CM

## 2024-06-03 LAB
ANION GAP SERPL CALCULATED.3IONS-SCNC: 10 MMOL/L (ref 7–15)
BUN SERPL-MCNC: 14.1 MG/DL (ref 8–23)
CALCIUM SERPL-MCNC: 9.3 MG/DL (ref 8.2–9.6)
CHLORIDE SERPL-SCNC: 101 MMOL/L (ref 98–107)
CREAT SERPL-MCNC: 0.74 MG/DL (ref 0.51–0.95)
DEPRECATED HCO3 PLAS-SCNC: 26 MMOL/L (ref 22–29)
EGFRCR SERPLBLD CKD-EPI 2021: 75 ML/MIN/1.73M2
ERYTHROCYTE [DISTWIDTH] IN BLOOD BY AUTOMATED COUNT: 12.9 % (ref 10–15)
GLUCOSE SERPL-MCNC: 87 MG/DL (ref 70–99)
HCT VFR BLD AUTO: 38 % (ref 35–47)
HGB BLD-MCNC: 12.4 G/DL (ref 11.7–15.7)
MCH RBC QN AUTO: 31.6 PG (ref 26.5–33)
MCHC RBC AUTO-ENTMCNC: 32.6 G/DL (ref 31.5–36.5)
MCV RBC AUTO: 97 FL (ref 78–100)
PLATELET # BLD AUTO: 257 10E3/UL (ref 150–450)
POTASSIUM SERPL-SCNC: 4.4 MMOL/L (ref 3.4–5.3)
RBC # BLD AUTO: 3.93 10E6/UL (ref 3.8–5.2)
SODIUM SERPL-SCNC: 137 MMOL/L (ref 135–145)
WBC # BLD AUTO: 8.7 10E3/UL (ref 4–11)

## 2024-06-03 PROCEDURE — 36415 COLL VENOUS BLD VENIPUNCTURE: CPT | Mod: ORL | Performed by: INTERNAL MEDICINE

## 2024-06-03 PROCEDURE — 85027 COMPLETE CBC AUTOMATED: CPT | Mod: ORL | Performed by: INTERNAL MEDICINE

## 2024-06-03 PROCEDURE — 80048 BASIC METABOLIC PNL TOTAL CA: CPT | Mod: ORL | Performed by: INTERNAL MEDICINE
